# Patient Record
Sex: FEMALE | Race: BLACK OR AFRICAN AMERICAN | NOT HISPANIC OR LATINO | Employment: FULL TIME | URBAN - METROPOLITAN AREA
[De-identification: names, ages, dates, MRNs, and addresses within clinical notes are randomized per-mention and may not be internally consistent; named-entity substitution may affect disease eponyms.]

---

## 2019-11-23 ENCOUNTER — OFFICE VISIT (OUTPATIENT)
Dept: URGENT CARE | Facility: CLINIC | Age: 34
End: 2019-11-23
Payer: COMMERCIAL

## 2019-11-23 VITALS
DIASTOLIC BLOOD PRESSURE: 96 MMHG | SYSTOLIC BLOOD PRESSURE: 139 MMHG | RESPIRATION RATE: 18 BRPM | WEIGHT: 256 LBS | HEIGHT: 67 IN | HEART RATE: 90 BPM | TEMPERATURE: 99 F | BODY MASS INDEX: 40.18 KG/M2 | OXYGEN SATURATION: 99 %

## 2019-11-23 DIAGNOSIS — R68.89 FLU-LIKE SYMPTOMS: Primary | ICD-10-CM

## 2019-11-23 DIAGNOSIS — J20.9 BRONCHOSPASM WITH BRONCHITIS, ACUTE: ICD-10-CM

## 2019-11-23 LAB
CTP QC/QA: YES
FLUAV AG NPH QL: NEGATIVE
FLUBV AG NPH QL: NEGATIVE

## 2019-11-23 PROCEDURE — 87804 POCT INFLUENZA A/B: ICD-10-PCS | Mod: 59,QW,S$GLB, | Performed by: NURSE PRACTITIONER

## 2019-11-23 PROCEDURE — 87804 INFLUENZA ASSAY W/OPTIC: CPT | Mod: QW,S$GLB,, | Performed by: NURSE PRACTITIONER

## 2019-11-23 PROCEDURE — 99203 PR OFFICE/OUTPT VISIT, NEW, LEVL III, 30-44 MIN: ICD-10-PCS | Mod: S$GLB,,, | Performed by: NURSE PRACTITIONER

## 2019-11-23 PROCEDURE — 3008F PR BODY MASS INDEX (BMI) DOCUMENTED: ICD-10-PCS | Mod: CPTII,S$GLB,, | Performed by: NURSE PRACTITIONER

## 2019-11-23 PROCEDURE — 99203 OFFICE O/P NEW LOW 30 MIN: CPT | Mod: S$GLB,,, | Performed by: NURSE PRACTITIONER

## 2019-11-23 PROCEDURE — 3008F BODY MASS INDEX DOCD: CPT | Mod: CPTII,S$GLB,, | Performed by: NURSE PRACTITIONER

## 2019-11-23 RX ORDER — BENZONATATE 200 MG/1
200 CAPSULE ORAL 3 TIMES DAILY PRN
Qty: 30 CAPSULE | Refills: 0 | Status: SHIPPED | OUTPATIENT
Start: 2019-11-23 | End: 2019-12-03

## 2019-11-23 RX ORDER — PREDNISONE 20 MG/1
40 TABLET ORAL DAILY
Qty: 8 TABLET | Refills: 0 | Status: SHIPPED | OUTPATIENT
Start: 2019-11-23 | End: 2019-11-27

## 2019-11-23 RX ORDER — SERTRALINE HYDROCHLORIDE 50 MG/1
50 TABLET, FILM COATED ORAL DAILY
COMMUNITY
End: 2020-11-13 | Stop reason: ALTCHOICE

## 2019-11-23 RX ORDER — FLUTICASONE PROPIONATE 50 MCG
1 SPRAY, SUSPENSION (ML) NASAL DAILY
Qty: 1 BOTTLE | Refills: 0 | Status: SHIPPED | OUTPATIENT
Start: 2019-11-23 | End: 2020-11-13 | Stop reason: ALTCHOICE

## 2019-11-23 RX ORDER — ALBUTEROL SULFATE 90 UG/1
2 AEROSOL, METERED RESPIRATORY (INHALATION) EVERY 6 HOURS PRN
Qty: 1 INHALER | Refills: 0 | Status: SHIPPED | OUTPATIENT
Start: 2019-11-23 | End: 2020-11-13 | Stop reason: ALTCHOICE

## 2019-11-23 RX ORDER — NORETHINDRONE ACETATE AND ETHINYL ESTRADIOL .5; 2.5 MG/1; UG/1
1 TABLET ORAL DAILY
COMMUNITY

## 2019-11-23 NOTE — PROGRESS NOTES
"Subjective:       Patient ID: Joyce Dumont is a 34 y.o. female.    Vitals:  height is 5' 7" (1.702 m) and weight is 116.1 kg (256 lb). Her oral temperature is 98.6 °F (37 °C). Her blood pressure is 139/96 (abnormal) and her pulse is 90. Her respiration is 18 and oxygen saturation is 99%.     Chief Complaint: Cough; Nasal Congestion; Generalized Body Aches; and Wheezing    Pt presents today with nasal congestion, cough, body aches X's 1 week. Pt is taking OTC meds for symptoms    Cough   This is a new problem. The current episode started in the past 7 days. The problem has been unchanged. The problem occurs constantly. The cough is non-productive. Associated symptoms include headaches, myalgias, nasal congestion, postnasal drip, a sore throat and wheezing. Pertinent negatives include no chest pain, chills, ear congestion, ear pain, eye redness, fever, heartburn, hemoptysis, rash, rhinorrhea, shortness of breath, sweats or weight loss. Nothing aggravates the symptoms. Treatments tried: OTC meds. The treatment provided no relief. There is no history of asthma, bronchiectasis, bronchitis, COPD, emphysema, environmental allergies or pneumonia.   Wheezing    Associated symptoms include coughing, headaches and a sore throat. Pertinent negatives include no chest pain, chills, ear pain, fever, hemoptysis, rash, rhinorrhea, shortness of breath, sputum production or vomiting. There is no history of asthma, COPD or pneumonia.       Constitution: Negative for chills, sweating, fatigue and fever.   HENT: Positive for postnasal drip and sore throat. Negative for ear pain, congestion, sinus pain, sinus pressure and voice change.    Neck: Negative for painful lymph nodes.   Cardiovascular: Negative for chest pain.   Eyes: Negative for eye redness.   Respiratory: Positive for cough and wheezing. Negative for chest tightness, sputum production, bloody sputum, COPD, shortness of breath, stridor and asthma.    Gastrointestinal: " Negative for nausea, vomiting and heartburn.   Musculoskeletal: Positive for muscle ache.   Skin: Negative for rash.   Allergic/Immunologic: Negative for environmental allergies, seasonal allergies and asthma.   Neurological: Positive for headaches.   Hematologic/Lymphatic: Negative for swollen lymph nodes.       Objective:      Physical Exam   Constitutional: She is oriented to person, place, and time. She appears well-developed and well-nourished. She is cooperative.  Non-toxic appearance. She has a sickly appearance. She does not appear ill. No distress.   HENT:   Head: Normocephalic and atraumatic.   Right Ear: Hearing, tympanic membrane, external ear and ear canal normal.   Left Ear: Hearing, tympanic membrane, external ear and ear canal normal.   Nose: Mucosal edema and rhinorrhea present. No nasal deformity. No epistaxis. Right sinus exhibits no maxillary sinus tenderness and no frontal sinus tenderness. Left sinus exhibits no maxillary sinus tenderness and no frontal sinus tenderness.   Mouth/Throat: Uvula is midline and mucous membranes are normal. No trismus in the jaw. Normal dentition. No uvula swelling. Posterior oropharyngeal edema and posterior oropharyngeal erythema present. No oropharyngeal exudate.   Eyes: Conjunctivae and lids are normal. No scleral icterus.   Neck: Trachea normal, full passive range of motion without pain and phonation normal. Neck supple. No neck rigidity. No edema and no erythema present.   Cardiovascular: Normal rate, regular rhythm, normal heart sounds, intact distal pulses and normal pulses.   Pulses:       Radial pulses are 2+ on the right side, and 2+ on the left side.   Pulmonary/Chest: Effort normal. No respiratory distress. She has decreased breath sounds. She has wheezes. She has no rhonchi.   Abdominal: Normal appearance.   Musculoskeletal: Normal range of motion. She exhibits no edema or deformity.   Lymphadenopathy:     She has cervical adenopathy.        Right  cervical: Superficial cervical adenopathy present.        Left cervical: Superficial cervical adenopathy present.   Neurological: She is alert and oriented to person, place, and time. She exhibits normal muscle tone. Coordination normal.   Skin: Skin is warm, dry, intact, not diaphoretic and not pale.   Psychiatric: She has a normal mood and affect. Her speech is normal and behavior is normal. Judgment and thought content normal. Cognition and memory are normal.   Nursing note and vitals reviewed.        Results for orders placed or performed in visit on 11/23/19   POCT Influenza A/B   Result Value Ref Range    Rapid Influenza A Ag Negative Negative    Rapid Influenza B Ag Negative Negative     Acceptable Yes        Assessment:       1. Flu-like symptoms    2. Bronchospasm with bronchitis, acute        Plan:       Close f/u if not improving.    Flu-like symptoms  -     POCT Influenza A/B    Bronchospasm with bronchitis, acute  -     benzonatate (TESSALON) 200 MG capsule; Take 1 capsule (200 mg total) by mouth 3 (three) times daily as needed.  Dispense: 30 capsule; Refill: 0  -     fluticasone propionate (FLONASE) 50 mcg/actuation nasal spray; 1 spray (50 mcg total) by Each Nostril route once daily.  Dispense: 1 Bottle; Refill: 0  -     predniSONE (DELTASONE) 20 MG tablet; Take 2 tablets (40 mg total) by mouth once daily. for 4 days  Dispense: 8 tablet; Refill: 0  -     albuterol (PROVENTIL/VENTOLIN HFA) 90 mcg/actuation inhaler; Inhale 2 puffs into the lungs every 6 (six) hours as needed. Rescue  Dispense: 1 Inhaler; Refill: 0      Patient Instructions   Follow up with your doctor in a few days.  Return to the urgent care or go to the ER if symptoms get worse.      START ORAL STEROID TODAY.    COUGH: MUCINEX DM AS DIRECTED.  TESSALON DURING THE DAY FOR COUGH CONTROL       WARM SALT WATER GARGLES FOR THROAT DISCOMFORT.    TYLENOL EVERY 4 HOURS OR/AND MOTRIN 600MG EVERY 6 HOURS FOR THROAT  PAIN/FEVER.    SINUS CONGESTION:    Continue cold medication plus add flonase daily.    SEE HANDOUT ON SINUSITUS AND BRONCHITIS      IF SYMPTOMS FAIL TO IMPROVE IN THE NEXT 5-7 DAYS, OR IMPROVE AND THEN WORSEN, FOLLOW UP WITH A HEALTHCARE PROVIDER.      Bronchitis, Viral (Adult)    You have a viral bronchitis. Bronchitis is inflammation and swelling of the lining of the lungs. This is often caused by an infection. Symptoms include a dry, hacking cough that is worse at night. The cough may bring up yellow-green mucus. You may also feel short of breath or wheeze. Other symptoms may include tiredness, chest discomfort, and chills.  Bronchitis that is caused by a virus is not treated with antibiotics. Instead, medicines may be given to help relieve symptoms. Symptoms can last up to 2 weeks, although the cough may last much longer.  This illness is contagious during the first few days and is spread through the air by coughing and sneezing, or by direct contact (touching the sick person and then touching your own eyes, nose, or mouth).  Most viral illnesses resolve within 10 to 14 days with rest and simple home remedies, although they may sometimes last for several weeks.  Home care  · If symptoms are severe, rest at home for the first 2 to 3 days. When you go back to your usual activities, don't let yourself get too tired.  · Do not smoke. Also avoid being exposed to secondhand smoke.  · You may use over-the-counter medicine to control fever or pain, unless another pain medicine was prescribed. (Note: If you have chronic liver or kidney disease or have ever had a stomach ulcer or gastrointestinal bleeding, talk with your healthcare provider before using these medicines. Also talk to your provider if you are taking medicine to prevent blood clots.) Aspirin should never be given to anyone younger than 18 years of age who is ill with a viral infection or fever. It may cause severe liver or brain damage.  · Your appetite may  be poor, so a light diet is fine. Avoid dehydration by drinking 6 to 8 glasses of fluids per day (such as water, soft drinks, sports drinks, juices, tea, or soup). Extra fluids will help loosen secretions in the nose and lungs.  · Over-the-counter cough, cold, and sore-throat medicines will not shorten the length of the illness, but they may help to reduce symptoms. (Note: Do not use decongestants if you have high blood pressure.)  Follow-up care  Follow up with your healthcare provider, or as advised. If you had an X-ray or ECG (electrocardiogram), a specialist will review it. You will be notified of any new findings that may affect your care.  Note: If you are age 65 or older, or if you have a chronic lung disease or condition that affects your immune system, or you smoke, talk to your healthcare provider about having pneumococcal vaccinations and a yearly influenza vaccination (flu shot).  When to seek medical advice  Call your healthcare provider right away if any of these occur:  · Fever of 100.4°F (38°C) or higher  · Coughing up increased amounts of colored sputum  · Weakness, drowsiness, headache, facial pain, ear pain, or a stiff neck  Call 911, or get immediate medical care  Contact emergency services right away if any of these occur:  · Coughing up blood  · Worsening weakness, drowsiness, headache, or stiff neck  · Trouble breathing, wheezing, or pain with breathing  Date Last Reviewed: 9/13/2015 © 2000-2017 [employer]. [address] [place] [address]. All rights reserved. This information is not intended as a substitute for professional medical care. Always follow your healthcare professional's instructions.        Sinusitis (No Antibiotics)    The sinuses are air-filled spaces within the bones of the face. They connect to the inside of the nose. Sinusitis is an inflammation of the tissue lining the sinus cavity. Sinus inflammation can occur during a cold. It can also be due to  allergies to pollens and other particles in the air. It can cause symptoms such as sinus congestion, headache, sore throat, facial swelling and fullness. It may also cause a low-grade fever. No infection is present, and no antibiotic treatment is needed.  Home care  · Drink plenty of water, hot tea, and other liquids. This may help thin mucus. It also may promote sinus drainage.  · Heat may help soothe painful areas of the face. Use a towel soaked in hot water. Or,  the shower and direct the hot spray onto your face. Using a vaporizer along with a menthol rub at night may also help.   · An expectorant containing guaifenesin may help thin the mucus and promote drainage from the sinuses.  · Over-the-counter decongestants may be used unless a similar medicine was prescribed. Nasal sprays work the fastest. Use one that contains phenylephrine or oxymetazoline. First blow the nose gently. Then use the spray. Do not use these medicines more often than directed on the label or symptoms may get worse. You may also use tablets containing pseudoephedrine. Avoid products that combine ingredients, because side effects may be increased. Read labels. You can also ask the pharmacist for help. (NOTE: Persons with high blood pressure should not use decongestants. They can raise blood pressure.)  · Over-the-counter antihistamines may help if allergies contributed to your sinusitis.    · Use acetaminophen or ibuprofen to control pain, unless another pain medicine was prescribed. (If you have chronic liver or kidney disease or ever had a stomach ulcer, talk with your doctor before using these medicines. Aspirin should never be used in anyone under 18 years of age who is ill with a fever. It may cause severe liver damage.)  · Use nasal rinses or irrigation as instructed by your health care provider.  · Don't smoke. This can worsen symptoms.  Follow-up care  Follow up with your healthcare provider or our staff if you are not  improving within the next week.  When to seek medical advice  Call your healthcare provider if any of these occur:  · Green or yellow discharge from the nose or into the throat  · Facial pain or headache becoming more severe  · Stiff neck  · Unusual drowsiness or confusion  · Swelling of the forehead or eyelids  · Vision problems, including blurred or double vision  · Fever of 100.4ºF (38ºC) or higher, or as directed by your healthcare provider  · Seizure  · Breathing problems  · Symptoms not resolving within 10 days  Date Last Reviewed: 4/13/2015  © 6146-4582 [employer]. [address] [place] [address]. All rights reserved. This information is not intended as a substitute for professional medical care. Always follow your healthcare professional's instructions.

## 2019-11-23 NOTE — PATIENT INSTRUCTIONS
Follow up with your doctor in a few days.  Return to the urgent care or go to the ER if symptoms get worse.      START ORAL STEROID TODAY.    COUGH: MUCINEX DM AS DIRECTED.  TESSALON DURING THE DAY FOR COUGH CONTROL       WARM SALT WATER GARGLES FOR THROAT DISCOMFORT.    TYLENOL EVERY 4 HOURS OR/AND MOTRIN 600MG EVERY 6 HOURS FOR THROAT PAIN/FEVER.    SINUS CONGESTION:    Continue cold medication plus add flonase daily.    SEE HANDOUT ON SINUSITUS AND BRONCHITIS      IF SYMPTOMS FAIL TO IMPROVE IN THE NEXT 5-7 DAYS, OR IMPROVE AND THEN WORSEN, FOLLOW UP WITH A HEALTHCARE PROVIDER.      Bronchitis, Viral (Adult)    You have a viral bronchitis. Bronchitis is inflammation and swelling of the lining of the lungs. This is often caused by an infection. Symptoms include a dry, hacking cough that is worse at night. The cough may bring up yellow-green mucus. You may also feel short of breath or wheeze. Other symptoms may include tiredness, chest discomfort, and chills.  Bronchitis that is caused by a virus is not treated with antibiotics. Instead, medicines may be given to help relieve symptoms. Symptoms can last up to 2 weeks, although the cough may last much longer.  This illness is contagious during the first few days and is spread through the air by coughing and sneezing, or by direct contact (touching the sick person and then touching your own eyes, nose, or mouth).  Most viral illnesses resolve within 10 to 14 days with rest and simple home remedies, although they may sometimes last for several weeks.  Home care  · If symptoms are severe, rest at home for the first 2 to 3 days. When you go back to your usual activities, don't let yourself get too tired.  · Do not smoke. Also avoid being exposed to secondhand smoke.  · You may use over-the-counter medicine to control fever or pain, unless another pain medicine was prescribed. (Note: If you have chronic liver or kidney disease or have ever had a stomach ulcer or  gastrointestinal bleeding, talk with your healthcare provider before using these medicines. Also talk to your provider if you are taking medicine to prevent blood clots.) Aspirin should never be given to anyone younger than 18 years of age who is ill with a viral infection or fever. It may cause severe liver or brain damage.  · Your appetite may be poor, so a light diet is fine. Avoid dehydration by drinking 6 to 8 glasses of fluids per day (such as water, soft drinks, sports drinks, juices, tea, or soup). Extra fluids will help loosen secretions in the nose and lungs.  · Over-the-counter cough, cold, and sore-throat medicines will not shorten the length of the illness, but they may help to reduce symptoms. (Note: Do not use decongestants if you have high blood pressure.)  Follow-up care  Follow up with your healthcare provider, or as advised. If you had an X-ray or ECG (electrocardiogram), a specialist will review it. You will be notified of any new findings that may affect your care.  Note: If you are age 65 or older, or if you have a chronic lung disease or condition that affects your immune system, or you smoke, talk to your healthcare provider about having pneumococcal vaccinations and a yearly influenza vaccination (flu shot).  When to seek medical advice  Call your healthcare provider right away if any of these occur:  · Fever of 100.4°F (38°C) or higher  · Coughing up increased amounts of colored sputum  · Weakness, drowsiness, headache, facial pain, ear pain, or a stiff neck  Call 911, or get immediate medical care  Contact emergency services right away if any of these occur:  · Coughing up blood  · Worsening weakness, drowsiness, headache, or stiff neck  · Trouble breathing, wheezing, or pain with breathing  Date Last Reviewed: 9/13/2015 © 2000-2017 [employer]. [address] [place] [address], [place] [address]. All rights reserved. This information is not intended as a substitute for professional  medical care. Always follow your healthcare professional's instructions.        Sinusitis (No Antibiotics)    The sinuses are air-filled spaces within the bones of the face. They connect to the inside of the nose. Sinusitis is an inflammation of the tissue lining the sinus cavity. Sinus inflammation can occur during a cold. It can also be due to allergies to pollens and other particles in the air. It can cause symptoms such as sinus congestion, headache, sore throat, facial swelling and fullness. It may also cause a low-grade fever. No infection is present, and no antibiotic treatment is needed.  Home care  · Drink plenty of water, hot tea, and other liquids. This may help thin mucus. It also may promote sinus drainage.  · Heat may help soothe painful areas of the face. Use a towel soaked in hot water. Or,  the shower and direct the hot spray onto your face. Using a vaporizer along with a menthol rub at night may also help.   · An expectorant containing guaifenesin may help thin the mucus and promote drainage from the sinuses.  · Over-the-counter decongestants may be used unless a similar medicine was prescribed. Nasal sprays work the fastest. Use one that contains phenylephrine or oxymetazoline. First blow the nose gently. Then use the spray. Do not use these medicines more often than directed on the label or symptoms may get worse. You may also use tablets containing pseudoephedrine. Avoid products that combine ingredients, because side effects may be increased. Read labels. You can also ask the pharmacist for help. (NOTE: Persons with high blood pressure should not use decongestants. They can raise blood pressure.)  · Over-the-counter antihistamines may help if allergies contributed to your sinusitis.    · Use acetaminophen or ibuprofen to control pain, unless another pain medicine was prescribed. (If you have chronic liver or kidney disease or ever had a stomach ulcer, talk with your doctor before using  these medicines. Aspirin should never be used in anyone under 18 years of age who is ill with a fever. It may cause severe liver damage.)  · Use nasal rinses or irrigation as instructed by your health care provider.  · Don't smoke. This can worsen symptoms.  Follow-up care  Follow up with your healthcare provider or our staff if you are not improving within the next week.  When to seek medical advice  Call your healthcare provider if any of these occur:  · Green or yellow discharge from the nose or into the throat  · Facial pain or headache becoming more severe  · Stiff neck  · Unusual drowsiness or confusion  · Swelling of the forehead or eyelids  · Vision problems, including blurred or double vision  · Fever of 100.4ºF (38ºC) or higher, or as directed by your healthcare provider  · Seizure  · Breathing problems  · Symptoms not resolving within 10 days  Date Last Reviewed: 4/13/2015  © 8833-6626 [employer]. [address] [place] [address], [place] [address]. All rights reserved. This information is not intended as a substitute for professional medical care. Always follow your healthcare professional's instructions.

## 2020-06-18 ENCOUNTER — OFFICE VISIT (OUTPATIENT)
Dept: URGENT CARE | Facility: CLINIC | Age: 35
End: 2020-06-18
Payer: COMMERCIAL

## 2020-06-18 VITALS
RESPIRATION RATE: 16 BRPM | HEIGHT: 67 IN | HEART RATE: 95 BPM | SYSTOLIC BLOOD PRESSURE: 151 MMHG | BODY MASS INDEX: 41.12 KG/M2 | TEMPERATURE: 98 F | OXYGEN SATURATION: 99 % | DIASTOLIC BLOOD PRESSURE: 98 MMHG | WEIGHT: 262 LBS

## 2020-06-18 DIAGNOSIS — R11.0 NAUSEA: Primary | ICD-10-CM

## 2020-06-18 PROCEDURE — U0003 INFECTIOUS AGENT DETECTION BY NUCLEIC ACID (DNA OR RNA); SEVERE ACUTE RESPIRATORY SYNDROME CORONAVIRUS 2 (SARS-COV-2) (CORONAVIRUS DISEASE [COVID-19]), AMPLIFIED PROBE TECHNIQUE, MAKING USE OF HIGH THROUGHPUT TECHNOLOGIES AS DESCRIBED BY CMS-2020-01-R: HCPCS

## 2020-06-18 PROCEDURE — 99211 PR OFFICE/OUTPT VISIT, EST, LEVL I: ICD-10-PCS | Mod: S$GLB,,, | Performed by: FAMILY MEDICINE

## 2020-06-18 PROCEDURE — 99211 OFF/OP EST MAY X REQ PHY/QHP: CPT | Mod: S$GLB,,, | Performed by: FAMILY MEDICINE

## 2020-06-19 ENCOUNTER — TELEPHONE (OUTPATIENT)
Dept: URGENT CARE | Facility: CLINIC | Age: 35
End: 2020-06-19

## 2020-06-19 LAB — SARS-COV-2 RNA RESP QL NAA+PROBE: NOT DETECTED

## 2020-06-19 NOTE — PROGRESS NOTES
Patient here for covid testing, sent by her PCP after being treated for nausea, sinus drainage.

## 2020-06-20 NOTE — TELEPHONE ENCOUNTER
----- Message from Sajan Ibrahim PA-C sent at 6/19/2020  7:31 PM CDT -----  Please call patient regarding neg test . Please reply to me once completed. Thanks!

## 2020-07-03 ENCOUNTER — OFFICE VISIT (OUTPATIENT)
Dept: URGENT CARE | Facility: CLINIC | Age: 35
End: 2020-07-03
Payer: COMMERCIAL

## 2020-07-03 VITALS
SYSTOLIC BLOOD PRESSURE: 145 MMHG | TEMPERATURE: 99 F | OXYGEN SATURATION: 99 % | DIASTOLIC BLOOD PRESSURE: 98 MMHG | HEART RATE: 98 BPM

## 2020-07-03 DIAGNOSIS — M79.10 MUSCLE PAIN: ICD-10-CM

## 2020-07-03 DIAGNOSIS — M54.42 ACUTE BACK PAIN WITH SCIATICA, LEFT: ICD-10-CM

## 2020-07-03 DIAGNOSIS — Z20.822 EXPOSURE TO COVID-19 VIRUS: Primary | ICD-10-CM

## 2020-07-03 PROCEDURE — 99213 OFFICE O/P EST LOW 20 MIN: CPT | Mod: 25,S$GLB,, | Performed by: PHYSICIAN ASSISTANT

## 2020-07-03 PROCEDURE — 96372 PR INJECTION,THERAP/PROPH/DIAG2ST, IM OR SUBCUT: ICD-10-PCS | Mod: S$GLB,,, | Performed by: FAMILY MEDICINE

## 2020-07-03 PROCEDURE — 96372 THER/PROPH/DIAG INJ SC/IM: CPT | Mod: S$GLB,,, | Performed by: FAMILY MEDICINE

## 2020-07-03 PROCEDURE — U0003 INFECTIOUS AGENT DETECTION BY NUCLEIC ACID (DNA OR RNA); SEVERE ACUTE RESPIRATORY SYNDROME CORONAVIRUS 2 (SARS-COV-2) (CORONAVIRUS DISEASE [COVID-19]), AMPLIFIED PROBE TECHNIQUE, MAKING USE OF HIGH THROUGHPUT TECHNOLOGIES AS DESCRIBED BY CMS-2020-01-R: HCPCS

## 2020-07-03 PROCEDURE — 99213 PR OFFICE/OUTPT VISIT, EST, LEVL III, 20-29 MIN: ICD-10-PCS | Mod: 25,S$GLB,, | Performed by: PHYSICIAN ASSISTANT

## 2020-07-03 RX ORDER — KETOROLAC TROMETHAMINE 30 MG/ML
30 INJECTION, SOLUTION INTRAMUSCULAR; INTRAVENOUS
Status: COMPLETED | OUTPATIENT
Start: 2020-07-03 | End: 2020-07-03

## 2020-07-03 RX ADMIN — KETOROLAC TROMETHAMINE 30 MG: 30 INJECTION, SOLUTION INTRAMUSCULAR; INTRAVENOUS at 12:07

## 2020-07-03 NOTE — PATIENT INSTRUCTIONS
Sciatica    Sciatica is a condition that causes pain in the lower back that spreads down into the buttock, hip, and leg. Sometimes the leg pain can happen without any back pain. Sciatica happens when a spinal nerve is irritated or has pressure put on it as comes out of the spinal canal in the lower back. This most often happens when a bulge or rupture of a nearby spinal disk presses on the nerve. Sciatica can also be caused by a narrowing of the spinal canal (spinal stenosis) or spasm of the muscle in the buttocks that the sciatic nerve passes through (pyriform muscle). Sciatica is also called lumbar radiculopathy.  Sciatica may begin after a sudden twisting or bending force, such as in a car accident. Or it can happen after a simple awkward movement. In either case, muscle spasm often also happens. Muscle spasm makes the pain worse.  A healthcare provider makes a diagnosis of sciatica from your symptoms and a physical exam. Unless you had an injury from a car accident or fall, you usually wont have X-rays taken at this time. This is because the nerves and disks in your back cant be seen on an X-ray. If the provider sees signs of a compressed nerve, you will need to schedule an MRI scan as an outpatient. Signs of a compressed nerve include loss of strength in a leg.  Most sciatica gets better with medicine, exercise, and physical therapy. If your symptoms continue after at least 3 months of medical treatment, you may need surgery or injections to your lower back.  Home care  Follow these tips when caring for yourself at home:  · You may need to stay in bed the first few days. But as soon as possible, begin sitting up or walking. This will help you avoid problems that come from staying in bed for long periods.  · When in bed, try to find a position that is comfortable. A firm mattress is best. Try lying flat on your back with pillows under your knees. You can also try lying on your side with your knees bent up  toward your chest and a pillow between your knees.  · Avoid sitting for long periods. This puts more stress on your lower back than standing or walking.  · Use heat from a hot shower, hot bath, or heating pad to help ease pain. Massage can also help. You can also try using an ice pack. You can make your own ice pack by putting ice cubes in a plastic bag. Wrap the bag in a thin towel. Try both heat and cold to see which works best. Use the method that feels best for 20 minutes several times a day.  · You may use acetaminophen or ibuprofen to ease pain, unless another pain medicine was prescribed. Note: If you have chronic liver or kidney disease, talk with your healthcare provider before taking these medicines. Also talk with your provider if youve had a stomach ulcer or gastrointestinal bleeding.  · Use safe lifting methods. Dont lift anything heavier than 15 pounds until all of the pain is gone.  Follow-up care  Follow up with your healthcare provider, or as advised. You may need physical therapy or additional tests.  If X-rays were taken, a radiologist will look at them. You will be told of any new findings that may affect your care.  When to seek medical advice  Call your healthcare provider right away if any of these occur:  · Pain gets worse even after taking prescribed medicine  · Weakness or numbness in 1 or both legs or hips  · Numbness in your groin or genital area  · You cant control your bowel or bladder  · Fever  · Redness or swelling over your back or spine   Date Last Reviewed: 8/1/2016  © 4245-8639 [employer]. [address] [place] [address], [place] [address]. All rights reserved. This information is not intended as a substitute for professional medical care. Always follow your healthcare professional's instructions.    Instructions for Patients Awaiting COVID-19 Test Results    You will either be called with your test result or it will be released to the patient portal.  If you have any  questions about your test, please visit [email or web address] or call our COVID-19 information line at [phone].    Prevention steps for patients with confirmed or suspected COVID-19     Stay home except to get medical care.   Separate yourself from other people and animals in your home   Call ahead before visiting your doctor.   Wear a facemask.   Cover your coughs and sneezes.   Clean your hands often.   Avoid sharing personal household items.   Clean all high-touch surfaces every day.   Monitor your symptoms. Seek prompt medical attention if your illness is worsening (e.g., difficulty breathing). Before seeking care, call your healthcare provider.   If you have a medical emergency and need to call 911, notify the dispatch personnel that you have, or are being evaluated for COVID-19. If possible, put on a facemask before emergency medical services arrive.   Discontinuing home isolation. Call your provider about guidance to discontinue home isolation.    Recommended precautions for household members, intimate partners, and caregivers in a nonhealthcare setting of a patient with symptomatic laboratory-confirmed COVID-19 or a patient under investigation.  Household members, intimate partners, and caregivers in a nonhealthcare setting may have close contact with a person with symptomatic, laboratory-confirmed COVID-19 or a person under investigation. Close contacts should monitor their health; they should call their healthcare provider right away if they develop symptoms suggestive of COVID-19 (e.g., fever, cough, shortness of breath).    Close contacts should also follow these recommendations:   Make sure that you understand and can help the patient follow their healthcare provider's instructions for medication(s) and care. You should help the patient with basic needs in the home and provide support for getting groceries, prescriptions, and other personal needs.   Monitor the patient's  symptoms. If the patient is getting sicker, call his or her healthcare provider and tell them that the patient has laboratory-confirmed COVID-19. This will help the healthcare provider's office take steps to keep other people in the office or waiting room from getting infected. Ask the healthcare provider to call the local or state health department for additional guidance. If the patient has a medical emergency and you need to call 911, notify the dispatch personnel that the patient has, or is being evaluated for COVID-19.   Household members should stay in another room or be  from the patient as much as possible. Household members should use a separate bedroom and bathroom, if available.   Prohibit visitors who do not have an essential need to be in the home.   Household members should care for any pets in the home. Do not handle pets or other animals while sick.   Make sure that shared spaces in the home have good air flow, such as by an air conditioner or an opened window, weather permitting.   Perform hand hygiene frequently. Wash your hands often with soap and water for at least 20 seconds or use an alcohol-based hand  that contains 60 to 95% alcohol, covering all surfaces of your hands and rubbing them together until they feel dry. Soap and water should be used preferentially if hands are visibly dirty.   Avoid touching your eyes, nose, and mouth with unwashed hands.   The patient should wear a facemask when you are around other people. If the patient is not able to wear a facemask (for example, because it causes trouble breathing), you, as the caregiver should wear a mask when you are in the same room as the patient.   Wear a disposable facemask and gloves when you touch or have contact with the patient's blood, stool, or body fluids, such as saliva, sputum, nasal mucus, vomit, urine.  o Throw out disposable facemasks and gloves after using them. Do not reuse.  o When removing  personal protective equipment, first remove and dispose of gloves. Then, immediately clean your hands with soap and water or alcohol-based hand . Next, remove and dispose of facemask, and immediately clean your hands again with soap and water or alcohol-based hand .   Avoid sharing household items with the patient. You should not share dishes, drinking glasses, cups, eating utensils, towels, bedding, or other items. After the patient uses these items, you should wash them thoroughly (see below Wash laundry thoroughly).   Clean all high-touch surfaces, such as counters, tabletops, doorknobs, bathroom fixtures, toilets, phones, keyboards, tablets, and bedside tables, every day. Also, clean any surfaces that may have blood, stool, or body fluids on them.   Use a household cleaning spray or wipe, according to the label instructions. Labels contain instructions for safe and effective use of the cleaning product including precautions you should take when applying the product, such as wearing gloves and making sure you have good ventilation during use of the product.   Wash laundry thoroughly.  o Immediately remove and wash clothes or bedding that have blood, stool, or body fluids on them.  o Wear disposable gloves while handling soiled items and keep soiled items away from your body. Clean your hands (with soap and water or an alcohol-based hand ) immediately after removing your gloves.  o Read and follow directions on labels of laundry or clothing items and detergent. In general, using a normal laundry detergent according to washing machine instructions and dry thoroughly using the warmest temperatures recommended on the clothing label.   Place all used disposable gloves, facemasks, and other contaminated items in a lined container before disposing of them with other household waste. Clean your hands (with soap and water or an alcohol-based hand ) immediately after handling  these items. Soap and water should be used preferentially if hands are visibly dirty.   Discuss any additional questions with your state or local health department or healthcare provider. Check available hours when contacting your local health department.    For more information see CDC link below.      https://www.cdc.gov/coronavirus/2019-ncov/hcp/guidance-prevent-spread.html#precautions        Sources:  [facility], [place] [facility] and [facility]        If not allergic,take tylenol (acetominophen) for fever control, chills, or body aches every 4 hours. Do not exceed 4000 mg/ day.If not allergic, take Motrin (Ibuprofen) every 4 hours for fever, chills, pain or inflammation. Do not exceed 2400 mg/day. You can alternate taking tylenol and motrin.    If you were prescribed a narcotic medication, do not drive or operate heavy equipment or machinery while taking these medications.  You must understand that you've received an Urgent Care treatment only and that you may be released before all your medical problems are known or treated. You, the patient, will arrange for follow up care as instructed.  Follow up with your PCP or specialty clinic as directed in the next 1-2 weeks if not improved or as needed.  You can call [phone] to schedule an appointment with the appropriate provider.  If your condition worsens we recommend that you receive another evaluation at the emergency room immediately or contact your primary medical clinics after hours call service to discuss your concerns.    Please return here or go to the Emergency Department for any concerns or worsening of condition.    If you have been referred to another provider and wish to call to check on the status of your referral, please call [facility] Sched[facility] at [phone]

## 2020-07-03 NOTE — PROGRESS NOTES
Subjective:       Patient ID: Joyce Dumont is a 35 y.o. female.    Vitals:  temperature is 98.6 °F (37 °C). Her blood pressure is 145/98 (abnormal) and her pulse is 98. Her oxygen saturation is 99%.     Chief Complaint: COVID-19 Concerns    Patient has had a sore throat and headaches off and on all week. She is also complaining about her sciatic nerve that has been hurting for 3 days. Patient was tested two weeks with  and  tested positive and she tested negative.     Other  This is a new problem. The current episode started in the past 7 days. The problem occurs constantly. The problem has been gradually worsening. Associated symptoms include chills, coughing, fatigue, headaches and a sore throat. Pertinent negatives include no abdominal pain, anorexia, arthralgias, change in bowel habit, chest pain, congestion, diaphoresis, fever, joint swelling, myalgias, nausea, neck pain, numbness, rash, swollen glands, urinary symptoms, vertigo, visual change, vomiting or weakness. Nothing aggravates the symptoms. Treatments tried: naproxen. The treatment provided mild relief.       Constitution: Positive for chills and fatigue. Negative for sweating and fever.   HENT: Positive for sore throat. Negative for congestion.    Neck: Negative for neck pain and painful lymph nodes.   Cardiovascular: Negative for chest pain and leg swelling.   Eyes: Negative for double vision and blurred vision.   Respiratory: Positive for cough. Negative for shortness of breath.    Gastrointestinal: Negative for abdominal pain, nausea, vomiting and diarrhea.   Genitourinary: Negative for dysuria, frequency, urgency and history of kidney stones.   Musculoskeletal: Negative for joint pain, joint swelling, muscle cramps and muscle ache.   Skin: Negative for color change, pale, rash and bruising.   Allergic/Immunologic: Negative for seasonal allergies.   Neurological: Positive for headaches. Negative for dizziness, history of  vertigo, light-headedness, passing out and numbness.   Hematologic/Lymphatic: Negative for swollen lymph nodes.   Psychiatric/Behavioral: Negative for nervous/anxious, sleep disturbance and depression. The patient is not nervous/anxious.        Objective:      Physical Exam   Constitutional: She is oriented to person, place, and time. She appears well-developed. She is cooperative.  Non-toxic appearance. She does not appear ill. No distress.   HENT:   Head: Normocephalic and atraumatic.   Right Ear: Hearing, tympanic membrane, external ear and ear canal normal.   Left Ear: Hearing, tympanic membrane, external ear and ear canal normal.   Nose: Nose normal. No mucosal edema, rhinorrhea or nasal deformity. No epistaxis. Right sinus exhibits no maxillary sinus tenderness and no frontal sinus tenderness. Left sinus exhibits no maxillary sinus tenderness and no frontal sinus tenderness.   Mouth/Throat: Uvula is midline, oropharynx is clear and moist and mucous membranes are normal. No trismus in the jaw. Normal dentition. No uvula swelling. No posterior oropharyngeal erythema.   Eyes: Conjunctivae and lids are normal. Right eye exhibits no discharge. Left eye exhibits no discharge. No scleral icterus.   Neck: Trachea normal, normal range of motion, full passive range of motion without pain and phonation normal. Neck supple.   Cardiovascular: Normal rate, regular rhythm, normal heart sounds and normal pulses.   Pulmonary/Chest: Effort normal and breath sounds normal. No respiratory distress.   Abdominal: Soft. Normal appearance and bowel sounds are normal. She exhibits no distension, no pulsatile midline mass and no mass. There is no abdominal tenderness.   Musculoskeletal:         General: No deformity.      Right hip: She exhibits decreased range of motion (limited ER hip w/o pain). She exhibits normal strength, no tenderness and no bony tenderness.      Left hip: She exhibits decreased range of motion (limited ER hip w  pain, paresthesia in HS). She exhibits normal strength, no tenderness, no bony tenderness and no swelling.   Neurological: She is alert and oriented to person, place, and time. She exhibits normal muscle tone. Coordination normal.   Skin: Skin is warm, dry, intact, not diaphoretic and not pale.   Psychiatric: Her speech is normal and behavior is normal. Judgment and thought content normal.   Nursing note and vitals reviewed.        Assessment:       1. Exposure to Covid-19 Virus    2. Muscle pain    3. Acute back pain with sciatica, left        Plan:         Exposure to Covid-19 Virus  -     ketorolac injection 30 mg    Muscle pain  -     COVID-19 Routine Screening  -     ketorolac injection 30 mg    Acute back pain with sciatica, left  -     ketorolac injection 30 mg    patient to resume HEP from PT for previous sciatica. Will call PT for another appointment.   Will call with results.   Patient Instructions     Sciatica    Sciatica is a condition that causes pain in the lower back that spreads down into the buttock, hip, and leg. Sometimes the leg pain can happen without any back pain. Sciatica happens when a spinal nerve is irritated or has pressure put on it as comes out of the spinal canal in the lower back. This most often happens when a bulge or rupture of a nearby spinal disk presses on the nerve. Sciatica can also be caused by a narrowing of the spinal canal (spinal stenosis) or spasm of the muscle in the buttocks that the sciatic nerve passes through (pyriform muscle). Sciatica is also called lumbar radiculopathy.  Sciatica may begin after a sudden twisting or bending force, such as in a car accident. Or it can happen after a simple awkward movement. In either case, muscle spasm often also happens. Muscle spasm makes the pain worse.  A healthcare provider makes a diagnosis of sciatica from your symptoms and a physical exam. Unless you had an injury from a car accident or fall, you usually wont have X-rays  taken at this time. This is because the nerves and disks in your back cant be seen on an X-ray. If the provider sees signs of a compressed nerve, you will need to schedule an MRI scan as an outpatient. Signs of a compressed nerve include loss of strength in a leg.  Most sciatica gets better with medicine, exercise, and physical therapy. If your symptoms continue after at least 3 months of medical treatment, you may need surgery or injections to your lower back.  Home care  Follow these tips when caring for yourself at home:  · You may need to stay in bed the first few days. But as soon as possible, begin sitting up or walking. This will help you avoid problems that come from staying in bed for long periods.  · When in bed, try to find a position that is comfortable. A firm mattress is best. Try lying flat on your back with pillows under your knees. You can also try lying on your side with your knees bent up toward your chest and a pillow between your knees.  · Avoid sitting for long periods. This puts more stress on your lower back than standing or walking.  · Use heat from a hot shower, hot bath, or heating pad to help ease pain. Massage can also help. You can also try using an ice pack. You can make your own ice pack by putting ice cubes in a plastic bag. Wrap the bag in a thin towel. Try both heat and cold to see which works best. Use the method that feels best for 20 minutes several times a day.  · You may use acetaminophen or ibuprofen to ease pain, unless another pain medicine was prescribed. Note: If you have chronic liver or kidney disease, talk with your healthcare provider before taking these medicines. Also talk with your provider if youve had a stomach ulcer or gastrointestinal bleeding.  · Use safe lifting methods. Dont lift anything heavier than 15 pounds until all of the pain is gone.  Follow-up care  Follow up with your healthcare provider, or as advised. You may need physical therapy or  additional tests.  If X-rays were taken, a radiologist will look at them. You will be told of any new findings that may affect your care.  When to seek medical advice  Call your healthcare provider right away if any of these occur:  · Pain gets worse even after taking prescribed medicine  · Weakness or numbness in 1 or both legs or hips  · Numbness in your groin or genital area  · You cant control your bowel or bladder  · Fever  · Redness or swelling over your back or spine   Date Last Reviewed: 8/1/2016 © 2000-2017 [employer]. [address] [place] [id number]. All rights reserved. This information is not intended as a substitute for professional medical care. Always follow your healthcare professional's instructions.    Instructions for Patients Awaiting COVID-19 Test Results    You will either be called with your test result or it will be released to the patient portal.  If you have any questions about your test, please visit [email or web address] or call our COVID-19 information line at [phone].    Prevention steps for patients with confirmed or suspected COVID-19     Stay home except to get medical care.   Separate yourself from other people and animals in your home   Call ahead before visiting your doctor.   Wear a facemask.   Cover your coughs and sneezes.   Clean your hands often.   Avoid sharing personal household items.   Clean all high-touch surfaces every day.   Monitor your symptoms. Seek prompt medical attention if your illness is worsening (e.g., difficulty breathing). Before seeking care, call your healthcare provider.   If you have a medical emergency and need to call 911, notify the dispatch personnel that you have, or are being evaluated for COVID-19. If possible, put on a facemask before emergency medical services arrive.   Discontinuing home isolation. Call your provider about guidance to discontinue home isolation.    Recommended precautions  for household members, intimate partners, and caregivers in a nonhealthcare setting of a patient with symptomatic laboratory-confirmed COVID-19 or a patient under investigation.  Household members, intimate partners, and caregivers in a nonhealthcare setting may have close contact with a person with symptomatic, laboratory-confirmed COVID-19 or a person under investigation. Close contacts should monitor their health; they should call their healthcare provider right away if they develop symptoms suggestive of COVID-19 (e.g., fever, cough, shortness of breath).    Close contacts should also follow these recommendations:   Make sure that you understand and can help the patient follow their healthcare provider's instructions for medication(s) and care. You should help the patient with basic needs in the home and provide support for getting groceries, prescriptions, and other personal needs.   Monitor the patient's symptoms. If the patient is getting sicker, call his or her healthcare provider and tell them that the patient has laboratory-confirmed COVID-19. This will help the healthcare provider's office take steps to keep other people in the office or waiting room from getting infected. Ask the healthcare provider to call the local or [facility] health department for additional guidance. If the patient has a medical emergency and you need to call 911, notify the dispatch personnel that the patient has, or is being evaluated for COVID-19.   Household members should stay in another room or be  from the patient as much as possible. Household members should use a separate bedroom and bathroom, if available.   Prohibit visitors who do not have an essential need to be in the home.   Household members should care for any pets in the home. Do not handle pets or other animals while sick.   Make sure that shared spaces in the home have good air flow, such as by an air conditioner or an opened window, weather  permitting.   Perform hand hygiene frequently. Wash your hands often with soap and water for at least 20 seconds or use an alcohol-based hand  that contains 60 to 95% alcohol, covering all surfaces of your hands and rubbing them together until they feel dry. Soap and water should be used preferentially if hands are visibly dirty.   Avoid touching your eyes, nose, and mouth with unwashed hands.   The patient should wear a facemask when you are around other people. If the patient is not able to wear a facemask (for example, because it causes trouble breathing), you, as the caregiver should wear a mask when you are in the same room as the patient.   Wear a disposable facemask and gloves when you touch or have contact with the patient's blood, stool, or body fluids, such as saliva, sputum, nasal mucus, vomit, urine.  o Throw out disposable facemasks and gloves after using them. Do not reuse.  o When removing personal protective equipment, first remove and dispose of gloves. Then, immediately clean your hands with soap and water or alcohol-based hand . Next, remove and dispose of facemask, and immediately clean your hands again with soap and water or alcohol-based hand .   Avoid sharing household items with the patient. You should not share dishes, drinking glasses, cups, eating utensils, towels, bedding, or other items. After the patient uses these items, you should wash them thoroughly (see below Wash laundry thoroughly).   Clean all high-touch surfaces, such as counters, tabletops, doorknobs, bathroom fixtures, toilets, phones, keyboards, tablets, and bedside tables, every day. Also, clean any surfaces that may have blood, stool, or body fluids on them.   Use a household cleaning spray or wipe, according to the label instructions. Labels contain instructions for safe and effective use of the cleaning product including precautions you should take when applying the product, such as  wearing gloves and making sure you have good ventilation during use of the product.   Wash laundry thoroughly.  o Immediately remove and wash clothes or bedding that have blood, stool, or body fluids on them.  o Wear disposable gloves while handling soiled items and keep soiled items away from your body. Clean your hands (with soap and water or an alcohol-based hand ) immediately after removing your gloves.  o Read and follow directions on labels of laundry or clothing items and detergent. In general, using a normal laundry detergent according to washing machine instructions and dry thoroughly using the warmest temperatures recommended on the clothing label.   Place all used disposable gloves, facemasks, and other contaminated items in a lined container before disposing of them with other household waste. Clean your hands (with soap and water or an alcohol-based hand ) immediately after handling these items. Soap and water should be used preferentially if hands are visibly dirty.   Discuss any additional questions with your [facility] or local health department or healthcare provider. Check available hours when contacting your local health department.    For more information see CDC link below.      https://www.cdc.gov/coronavirus/2019-ncov/hcp/guidance-prevent-spread.html#precautions        Sources:  [place], [place] [facility] and [facility]        If not allergic,take tylenol (acetominophen) for fever control, chills, or body aches every 4 hours. Do not exceed 4000 mg/ day.If not allergic, take Motrin (Ibuprofen) every 4 hours for fever, chills, pain or inflammation. Do not exceed 2400 mg/day. You can alternate taking tylenol and motrin.    If you were prescribed a narcotic medication, do not drive or operate heavy equipment or machinery while taking these medications.  You must understand that you've received an Urgent Care treatment only and that you may be released before all your  medical problems are known or treated. You, the patient, will arrange for follow up care as instructed.  Follow up with your PCP or specialty clinic as directed in the next 1-2 weeks if not improved or as needed.  You can call [phone] to schedule an appointment with the appropriate provider.  If your condition worsens we recommend that you receive another evaluation at the emergency room immediately or contact your primary medical clinics after hours call service to discuss your concerns.    Please return here or go to the Emergency Department for any concerns or worsening of condition.    If you have been referred to another provider and wish to call to check on the status of your referral, please call [facility] Scheduling at [phone]

## 2020-07-07 LAB — SARS-COV-2 RNA RESP QL NAA+PROBE: NOT DETECTED

## 2021-01-28 ENCOUNTER — OFFICE VISIT (OUTPATIENT)
Dept: URGENT CARE | Facility: CLINIC | Age: 36
End: 2021-01-28
Payer: COMMERCIAL

## 2021-01-28 VITALS
HEART RATE: 105 BPM | DIASTOLIC BLOOD PRESSURE: 101 MMHG | OXYGEN SATURATION: 97 % | HEIGHT: 67 IN | WEIGHT: 273 LBS | BODY MASS INDEX: 42.85 KG/M2 | SYSTOLIC BLOOD PRESSURE: 145 MMHG | TEMPERATURE: 98 F

## 2021-01-28 DIAGNOSIS — U07.1 COVID-19 VIRUS INFECTION: ICD-10-CM

## 2021-01-28 DIAGNOSIS — U07.1 COVID-19 VIRUS DETECTED: ICD-10-CM

## 2021-01-28 DIAGNOSIS — R09.81 SINUS CONGESTION: Primary | ICD-10-CM

## 2021-01-28 LAB
CTP QC/QA: YES
SARS-COV-2 RDRP RESP QL NAA+PROBE: POSITIVE

## 2021-01-28 PROCEDURE — 99214 PR OFFICE/OUTPT VISIT, EST, LEVL IV, 30-39 MIN: ICD-10-PCS | Mod: S$GLB,,, | Performed by: FAMILY MEDICINE

## 2021-01-28 PROCEDURE — 99214 OFFICE O/P EST MOD 30 MIN: CPT | Mod: S$GLB,,, | Performed by: FAMILY MEDICINE

## 2021-01-28 PROCEDURE — U0002 COVID-19 LAB TEST NON-CDC: HCPCS | Mod: QW,S$GLB,, | Performed by: FAMILY MEDICINE

## 2021-01-28 PROCEDURE — 3008F PR BODY MASS INDEX (BMI) DOCUMENTED: ICD-10-PCS | Mod: CPTII,S$GLB,, | Performed by: FAMILY MEDICINE

## 2021-01-28 PROCEDURE — 3008F BODY MASS INDEX DOCD: CPT | Mod: CPTII,S$GLB,, | Performed by: FAMILY MEDICINE

## 2021-01-28 PROCEDURE — U0002: ICD-10-PCS | Mod: QW,S$GLB,, | Performed by: FAMILY MEDICINE

## 2021-01-28 RX ORDER — PROMETHAZINE HYDROCHLORIDE 6.25 MG/5ML
SYRUP ORAL
Qty: 120 ML | Refills: 1 | Status: SHIPPED | OUTPATIENT
Start: 2021-01-28 | End: 2021-03-16

## 2021-01-28 RX ORDER — DESOGESTREL AND ETHINYL ESTRADIOL 0.15-0.03
KIT ORAL
COMMUNITY
Start: 2021-01-22

## 2021-01-28 RX ORDER — BENZONATATE 100 MG/1
100 CAPSULE ORAL EVERY 6 HOURS PRN
Qty: 30 CAPSULE | Refills: 1 | Status: SHIPPED | OUTPATIENT
Start: 2021-01-28 | End: 2021-03-16

## 2021-03-16 ENCOUNTER — OFFICE VISIT (OUTPATIENT)
Dept: FAMILY MEDICINE | Facility: CLINIC | Age: 36
End: 2021-03-16
Payer: COMMERCIAL

## 2021-03-16 DIAGNOSIS — M54.50 ACUTE MIDLINE LOW BACK PAIN WITHOUT SCIATICA: Primary | ICD-10-CM

## 2021-03-16 DIAGNOSIS — Z00.00 ENCOUNTER FOR BLOOD TEST FOR ROUTINE GENERAL PHYSICAL EXAMINATION: ICD-10-CM

## 2021-03-16 DIAGNOSIS — F51.05 INSOMNIA SECONDARY TO SITUATIONAL DEPRESSION: ICD-10-CM

## 2021-03-16 DIAGNOSIS — F43.21 INSOMNIA SECONDARY TO SITUATIONAL DEPRESSION: ICD-10-CM

## 2021-03-16 PROCEDURE — 99203 PR OFFICE/OUTPT VISIT, NEW, LEVL III, 30-44 MIN: ICD-10-PCS | Mod: 95,,, | Performed by: INTERNAL MEDICINE

## 2021-03-16 PROCEDURE — 99203 OFFICE O/P NEW LOW 30 MIN: CPT | Mod: 95,,, | Performed by: INTERNAL MEDICINE

## 2021-03-16 RX ORDER — TIZANIDINE 4 MG/1
4 TABLET ORAL NIGHTLY PRN
Qty: 30 TABLET | Refills: 1 | Status: SHIPPED | OUTPATIENT
Start: 2021-03-16 | End: 2021-05-19

## 2021-03-16 RX ORDER — PREDNISONE 10 MG/1
TABLET ORAL
Qty: 42 TABLET | Refills: 0 | Status: SHIPPED | OUTPATIENT
Start: 2021-03-16 | End: 2021-03-24

## 2021-03-18 ENCOUNTER — TELEPHONE (OUTPATIENT)
Dept: FAMILY MEDICINE | Facility: CLINIC | Age: 36
End: 2021-03-18

## 2021-03-23 ENCOUNTER — TELEPHONE (OUTPATIENT)
Dept: FAMILY MEDICINE | Facility: CLINIC | Age: 36
End: 2021-03-23

## 2021-03-24 ENCOUNTER — LAB VISIT (OUTPATIENT)
Dept: LAB | Facility: HOSPITAL | Age: 36
End: 2021-03-24
Attending: INTERNAL MEDICINE
Payer: COMMERCIAL

## 2021-03-24 ENCOUNTER — OFFICE VISIT (OUTPATIENT)
Dept: FAMILY MEDICINE | Facility: CLINIC | Age: 36
End: 2021-03-24
Payer: COMMERCIAL

## 2021-03-24 VITALS
WEIGHT: 293 LBS | HEART RATE: 91 BPM | OXYGEN SATURATION: 97 % | SYSTOLIC BLOOD PRESSURE: 130 MMHG | DIASTOLIC BLOOD PRESSURE: 88 MMHG | HEIGHT: 67 IN | BODY MASS INDEX: 45.99 KG/M2

## 2021-03-24 DIAGNOSIS — Z00.00 ANNUAL PHYSICAL EXAM: ICD-10-CM

## 2021-03-24 DIAGNOSIS — Z86.59 HISTORY OF ADHD: ICD-10-CM

## 2021-03-24 DIAGNOSIS — Z86.16 PERSONAL HISTORY OF COVID-19: ICD-10-CM

## 2021-03-24 DIAGNOSIS — Z83.3 FAMILY HISTORY OF DIABETES MELLITUS (DM): ICD-10-CM

## 2021-03-24 DIAGNOSIS — Z00.00 ENCOUNTER FOR BLOOD TEST FOR ROUTINE GENERAL PHYSICAL EXAMINATION: ICD-10-CM

## 2021-03-24 DIAGNOSIS — Z00.00 ANNUAL PHYSICAL EXAM: Primary | ICD-10-CM

## 2021-03-24 DIAGNOSIS — I10 ESSENTIAL HYPERTENSION: ICD-10-CM

## 2021-03-24 LAB
25(OH)D3+25(OH)D2 SERPL-MCNC: 11 NG/ML (ref 30–96)
ALBUMIN SERPL BCP-MCNC: 2.7 G/DL (ref 3.5–5.2)
ALP SERPL-CCNC: 93 U/L (ref 55–135)
ALT SERPL W/O P-5'-P-CCNC: 25 U/L (ref 10–44)
ANION GAP SERPL CALC-SCNC: 7 MMOL/L (ref 8–16)
AST SERPL-CCNC: 13 U/L (ref 10–40)
BASOPHILS # BLD AUTO: 0.03 K/UL (ref 0–0.2)
BASOPHILS NFR BLD: 0.3 % (ref 0–1.9)
BILIRUB SERPL-MCNC: 0.4 MG/DL (ref 0.1–1)
BUN SERPL-MCNC: 12 MG/DL (ref 6–20)
CALCIUM SERPL-MCNC: 8.1 MG/DL (ref 8.7–10.5)
CHLORIDE SERPL-SCNC: 103 MMOL/L (ref 95–110)
CHOLEST SERPL-MCNC: 189 MG/DL (ref 120–199)
CHOLEST/HDLC SERPL: 2.7 {RATIO} (ref 2–5)
CO2 SERPL-SCNC: 28 MMOL/L (ref 23–29)
CREAT SERPL-MCNC: 0.8 MG/DL (ref 0.5–1.4)
DIFFERENTIAL METHOD: ABNORMAL
EOSINOPHIL # BLD AUTO: 0.4 K/UL (ref 0–0.5)
EOSINOPHIL NFR BLD: 3.2 % (ref 0–8)
ERYTHROCYTE [DISTWIDTH] IN BLOOD BY AUTOMATED COUNT: 16.8 % (ref 11.5–14.5)
EST. GFR  (AFRICAN AMERICAN): >60 ML/MIN/1.73 M^2
EST. GFR  (NON AFRICAN AMERICAN): >60 ML/MIN/1.73 M^2
ESTIMATED AVG GLUCOSE: 117 MG/DL (ref 68–131)
GLUCOSE SERPL-MCNC: 75 MG/DL (ref 70–110)
HBA1C MFR BLD: 5.7 % (ref 4–5.6)
HCT VFR BLD AUTO: 38.3 % (ref 37–48.5)
HDLC SERPL-MCNC: 69 MG/DL (ref 40–75)
HDLC SERPL: 36.5 % (ref 20–50)
HGB BLD-MCNC: 12 G/DL (ref 12–16)
IMM GRANULOCYTES # BLD AUTO: 0.06 K/UL (ref 0–0.04)
IMM GRANULOCYTES NFR BLD AUTO: 0.5 % (ref 0–0.5)
LDLC SERPL CALC-MCNC: 88.8 MG/DL (ref 63–159)
LYMPHOCYTES # BLD AUTO: 2.3 K/UL (ref 1–4.8)
LYMPHOCYTES NFR BLD: 20 % (ref 18–48)
MCH RBC QN AUTO: 27.1 PG (ref 27–31)
MCHC RBC AUTO-ENTMCNC: 31.3 G/DL (ref 32–36)
MCV RBC AUTO: 87 FL (ref 82–98)
MONOCYTES # BLD AUTO: 0.8 K/UL (ref 0.3–1)
MONOCYTES NFR BLD: 7.2 % (ref 4–15)
NEUTROPHILS # BLD AUTO: 7.9 K/UL (ref 1.8–7.7)
NEUTROPHILS NFR BLD: 68.8 % (ref 38–73)
NONHDLC SERPL-MCNC: 120 MG/DL
NRBC BLD-RTO: 0 /100 WBC
PLATELET # BLD AUTO: 233 K/UL (ref 150–350)
PMV BLD AUTO: 12.8 FL (ref 9.2–12.9)
POTASSIUM SERPL-SCNC: 4 MMOL/L (ref 3.5–5.1)
PROT SERPL-MCNC: 7 G/DL (ref 6–8.4)
RBC # BLD AUTO: 4.43 M/UL (ref 4–5.4)
SODIUM SERPL-SCNC: 138 MMOL/L (ref 136–145)
TRIGL SERPL-MCNC: 156 MG/DL (ref 30–150)
TSH SERPL DL<=0.005 MIU/L-ACNC: 1.97 UIU/ML (ref 0.4–4)
WBC # BLD AUTO: 11.42 K/UL (ref 3.9–12.7)

## 2021-03-24 PROCEDURE — 3075F SYST BP GE 130 - 139MM HG: CPT | Mod: CPTII,S$GLB,, | Performed by: NURSE PRACTITIONER

## 2021-03-24 PROCEDURE — 82306 VITAMIN D 25 HYDROXY: CPT | Performed by: NURSE PRACTITIONER

## 2021-03-24 PROCEDURE — 99999 PR PBB SHADOW E&M-EST. PATIENT-LVL IV: CPT | Mod: PBBFAC,,, | Performed by: NURSE PRACTITIONER

## 2021-03-24 PROCEDURE — 86803 HEPATITIS C AB TEST: CPT | Performed by: INTERNAL MEDICINE

## 2021-03-24 PROCEDURE — 3075F PR MOST RECENT SYSTOLIC BLOOD PRESS GE 130-139MM HG: ICD-10-PCS | Mod: CPTII,S$GLB,, | Performed by: NURSE PRACTITIONER

## 2021-03-24 PROCEDURE — 80053 COMPREHEN METABOLIC PANEL: CPT | Performed by: NURSE PRACTITIONER

## 2021-03-24 PROCEDURE — 80061 LIPID PANEL: CPT | Performed by: NURSE PRACTITIONER

## 2021-03-24 PROCEDURE — 1126F PR PAIN SEVERITY QUANTIFIED, NO PAIN PRESENT: ICD-10-PCS | Mod: S$GLB,,, | Performed by: NURSE PRACTITIONER

## 2021-03-24 PROCEDURE — 99999 PR PBB SHADOW E&M-EST. PATIENT-LVL IV: ICD-10-PCS | Mod: PBBFAC,,, | Performed by: NURSE PRACTITIONER

## 2021-03-24 PROCEDURE — 86703 HIV-1/HIV-2 1 RESULT ANTBDY: CPT | Performed by: INTERNAL MEDICINE

## 2021-03-24 PROCEDURE — 3079F DIAST BP 80-89 MM HG: CPT | Mod: CPTII,S$GLB,, | Performed by: NURSE PRACTITIONER

## 2021-03-24 PROCEDURE — 1126F AMNT PAIN NOTED NONE PRSNT: CPT | Mod: S$GLB,,, | Performed by: NURSE PRACTITIONER

## 2021-03-24 PROCEDURE — 3008F PR BODY MASS INDEX (BMI) DOCUMENTED: ICD-10-PCS | Mod: CPTII,S$GLB,, | Performed by: NURSE PRACTITIONER

## 2021-03-24 PROCEDURE — 85025 COMPLETE CBC W/AUTO DIFF WBC: CPT | Performed by: NURSE PRACTITIONER

## 2021-03-24 PROCEDURE — 99395 PR PREVENTIVE VISIT,EST,18-39: ICD-10-PCS | Mod: S$GLB,,, | Performed by: NURSE PRACTITIONER

## 2021-03-24 PROCEDURE — 84443 ASSAY THYROID STIM HORMONE: CPT | Performed by: NURSE PRACTITIONER

## 2021-03-24 PROCEDURE — 99395 PREV VISIT EST AGE 18-39: CPT | Mod: S$GLB,,, | Performed by: NURSE PRACTITIONER

## 2021-03-24 PROCEDURE — 83036 HEMOGLOBIN GLYCOSYLATED A1C: CPT | Performed by: NURSE PRACTITIONER

## 2021-03-24 PROCEDURE — 3008F BODY MASS INDEX DOCD: CPT | Mod: CPTII,S$GLB,, | Performed by: NURSE PRACTITIONER

## 2021-03-24 PROCEDURE — 3079F PR MOST RECENT DIASTOLIC BLOOD PRESSURE 80-89 MM HG: ICD-10-PCS | Mod: CPTII,S$GLB,, | Performed by: NURSE PRACTITIONER

## 2021-03-24 PROCEDURE — 36415 COLL VENOUS BLD VENIPUNCTURE: CPT | Mod: PO | Performed by: NURSE PRACTITIONER

## 2021-03-24 RX ORDER — BENZONATATE 100 MG/1
CAPSULE ORAL 3 TIMES DAILY PRN
COMMUNITY
Start: 2021-03-16 | End: 2021-07-06

## 2021-03-24 RX ORDER — LISINOPRIL 5 MG/1
5 TABLET ORAL DAILY
Qty: 90 TABLET | Refills: 1 | Status: SHIPPED | OUTPATIENT
Start: 2021-03-24 | End: 2021-09-28

## 2021-03-25 LAB — HIV 1+2 AB+HIV1 P24 AG SERPL QL IA: NEGATIVE

## 2021-03-26 LAB — HCV AB SERPL QL IA: NEGATIVE

## 2021-03-29 DIAGNOSIS — E55.9 VITAMIN D INSUFFICIENCY: Primary | ICD-10-CM

## 2021-03-29 RX ORDER — ERGOCALCIFEROL 1.25 MG/1
50000 CAPSULE ORAL
Qty: 12 CAPSULE | Refills: 1 | Status: SHIPPED | OUTPATIENT
Start: 2021-03-29 | End: 2021-09-08

## 2021-03-30 ENCOUNTER — TELEPHONE (OUTPATIENT)
Dept: FAMILY MEDICINE | Facility: CLINIC | Age: 36
End: 2021-03-30

## 2021-04-01 ENCOUNTER — OFFICE VISIT (OUTPATIENT)
Dept: FAMILY MEDICINE | Facility: CLINIC | Age: 36
End: 2021-04-01
Payer: COMMERCIAL

## 2021-04-01 DIAGNOSIS — Z71.2 ENCOUNTER TO DISCUSS TEST RESULTS: Primary | ICD-10-CM

## 2021-04-01 DIAGNOSIS — E55.9 VITAMIN D DEFICIENCY: ICD-10-CM

## 2021-04-01 DIAGNOSIS — R73.03 PRE-DIABETES: ICD-10-CM

## 2021-04-01 PROCEDURE — 99213 PR OFFICE/OUTPT VISIT, EST, LEVL III, 20-29 MIN: ICD-10-PCS | Mod: 95,,, | Performed by: NURSE PRACTITIONER

## 2021-04-01 PROCEDURE — 99213 OFFICE O/P EST LOW 20 MIN: CPT | Mod: 95,,, | Performed by: NURSE PRACTITIONER

## 2021-04-03 PROBLEM — R73.03 PRE-DIABETES: Status: ACTIVE | Noted: 2021-04-03

## 2021-04-03 PROBLEM — E55.9 VITAMIN D DEFICIENCY: Status: ACTIVE | Noted: 2021-04-03

## 2021-05-10 ENCOUNTER — PATIENT MESSAGE (OUTPATIENT)
Dept: RESEARCH | Facility: HOSPITAL | Age: 36
End: 2021-05-10

## 2021-06-01 ENCOUNTER — OFFICE VISIT (OUTPATIENT)
Dept: FAMILY MEDICINE | Facility: CLINIC | Age: 36
End: 2021-06-01
Payer: COMMERCIAL

## 2021-06-01 VITALS
OXYGEN SATURATION: 98 % | HEIGHT: 67 IN | SYSTOLIC BLOOD PRESSURE: 122 MMHG | WEIGHT: 293 LBS | DIASTOLIC BLOOD PRESSURE: 88 MMHG | HEART RATE: 86 BPM | BODY MASS INDEX: 45.99 KG/M2 | TEMPERATURE: 99 F

## 2021-06-01 DIAGNOSIS — E83.51 HYPOCALCEMIA: ICD-10-CM

## 2021-06-01 DIAGNOSIS — E88.09 HYPOALBUMINEMIA: ICD-10-CM

## 2021-06-01 DIAGNOSIS — E66.01 CLASS 3 SEVERE OBESITY DUE TO EXCESS CALORIES WITH SERIOUS COMORBIDITY AND BODY MASS INDEX (BMI) OF 45.0 TO 49.9 IN ADULT: ICD-10-CM

## 2021-06-01 DIAGNOSIS — E55.9 VITAMIN D DEFICIENCY: ICD-10-CM

## 2021-06-01 DIAGNOSIS — R73.03 PRE-DIABETES: ICD-10-CM

## 2021-06-01 DIAGNOSIS — E78.49 OTHER HYPERLIPIDEMIA: ICD-10-CM

## 2021-06-01 DIAGNOSIS — I10 ESSENTIAL HYPERTENSION: Primary | ICD-10-CM

## 2021-06-01 PROCEDURE — 3079F PR MOST RECENT DIASTOLIC BLOOD PRESSURE 80-89 MM HG: ICD-10-PCS | Mod: CPTII,S$GLB,, | Performed by: FAMILY MEDICINE

## 2021-06-01 PROCEDURE — 3074F PR MOST RECENT SYSTOLIC BLOOD PRESSURE < 130 MM HG: ICD-10-PCS | Mod: CPTII,S$GLB,, | Performed by: FAMILY MEDICINE

## 2021-06-01 PROCEDURE — 3008F PR BODY MASS INDEX (BMI) DOCUMENTED: ICD-10-PCS | Mod: CPTII,S$GLB,, | Performed by: FAMILY MEDICINE

## 2021-06-01 PROCEDURE — 99999 PR PBB SHADOW E&M-EST. PATIENT-LVL III: CPT | Mod: PBBFAC,,, | Performed by: FAMILY MEDICINE

## 2021-06-01 PROCEDURE — 3079F DIAST BP 80-89 MM HG: CPT | Mod: CPTII,S$GLB,, | Performed by: FAMILY MEDICINE

## 2021-06-01 PROCEDURE — 3008F BODY MASS INDEX DOCD: CPT | Mod: CPTII,S$GLB,, | Performed by: FAMILY MEDICINE

## 2021-06-01 PROCEDURE — 99999 PR PBB SHADOW E&M-EST. PATIENT-LVL III: ICD-10-PCS | Mod: PBBFAC,,, | Performed by: FAMILY MEDICINE

## 2021-06-01 PROCEDURE — 1126F AMNT PAIN NOTED NONE PRSNT: CPT | Mod: S$GLB,,, | Performed by: FAMILY MEDICINE

## 2021-06-01 PROCEDURE — 99214 OFFICE O/P EST MOD 30 MIN: CPT | Mod: S$GLB,,, | Performed by: FAMILY MEDICINE

## 2021-06-01 PROCEDURE — 1126F PR PAIN SEVERITY QUANTIFIED, NO PAIN PRESENT: ICD-10-PCS | Mod: S$GLB,,, | Performed by: FAMILY MEDICINE

## 2021-06-01 PROCEDURE — 99214 PR OFFICE/OUTPT VISIT, EST, LEVL IV, 30-39 MIN: ICD-10-PCS | Mod: S$GLB,,, | Performed by: FAMILY MEDICINE

## 2021-06-01 PROCEDURE — 3074F SYST BP LT 130 MM HG: CPT | Mod: CPTII,S$GLB,, | Performed by: FAMILY MEDICINE

## 2021-06-14 ENCOUNTER — DOCUMENTATION ONLY (OUTPATIENT)
Dept: FAMILY MEDICINE | Facility: CLINIC | Age: 36
End: 2021-06-14

## 2021-07-06 ENCOUNTER — OFFICE VISIT (OUTPATIENT)
Dept: FAMILY MEDICINE | Facility: CLINIC | Age: 36
End: 2021-07-06
Payer: COMMERCIAL

## 2021-07-06 DIAGNOSIS — K29.00 OTHER ACUTE GASTRITIS WITHOUT HEMORRHAGE: Primary | ICD-10-CM

## 2021-07-06 PROCEDURE — 99213 PR OFFICE/OUTPT VISIT, EST, LEVL III, 20-29 MIN: ICD-10-PCS | Mod: 95,,, | Performed by: INTERNAL MEDICINE

## 2021-07-06 PROCEDURE — 99213 OFFICE O/P EST LOW 20 MIN: CPT | Mod: 95,,, | Performed by: INTERNAL MEDICINE

## 2021-07-06 RX ORDER — PANTOPRAZOLE SODIUM 40 MG/1
40 TABLET, DELAYED RELEASE ORAL DAILY
Qty: 30 TABLET | Refills: 11 | Status: SHIPPED | OUTPATIENT
Start: 2021-07-06 | End: 2022-07-22 | Stop reason: SDUPTHER

## 2021-09-22 ENCOUNTER — INFUSION (OUTPATIENT)
Dept: INFECTIOUS DISEASES | Facility: HOSPITAL | Age: 36
End: 2021-09-22
Attending: STUDENT IN AN ORGANIZED HEALTH CARE EDUCATION/TRAINING PROGRAM
Payer: COMMERCIAL

## 2021-09-22 VITALS
RESPIRATION RATE: 19 BRPM | HEART RATE: 101 BPM | OXYGEN SATURATION: 95 % | DIASTOLIC BLOOD PRESSURE: 77 MMHG | TEMPERATURE: 98 F | SYSTOLIC BLOOD PRESSURE: 120 MMHG

## 2021-09-22 DIAGNOSIS — U07.1 COVID-19: ICD-10-CM

## 2021-09-22 PROCEDURE — 63600175 PHARM REV CODE 636 W HCPCS: Performed by: STUDENT IN AN ORGANIZED HEALTH CARE EDUCATION/TRAINING PROGRAM

## 2021-09-22 PROCEDURE — 25000003 PHARM REV CODE 250: Performed by: STUDENT IN AN ORGANIZED HEALTH CARE EDUCATION/TRAINING PROGRAM

## 2021-09-22 PROCEDURE — M0243 CASIRIVI AND IMDEVI INFUSION: HCPCS | Performed by: STUDENT IN AN ORGANIZED HEALTH CARE EDUCATION/TRAINING PROGRAM

## 2021-09-22 RX ORDER — ACETAMINOPHEN 325 MG/1
650 TABLET ORAL ONCE AS NEEDED
Status: DISCONTINUED | OUTPATIENT
Start: 2021-09-22 | End: 2022-05-05

## 2021-09-22 RX ORDER — ALBUTEROL SULFATE 90 UG/1
2 AEROSOL, METERED RESPIRATORY (INHALATION)
Status: DISCONTINUED | OUTPATIENT
Start: 2021-09-22 | End: 2022-05-05

## 2021-09-22 RX ORDER — ONDANSETRON 4 MG/1
4 TABLET, ORALLY DISINTEGRATING ORAL ONCE AS NEEDED
Status: DISCONTINUED | OUTPATIENT
Start: 2021-09-22 | End: 2022-05-05

## 2021-09-22 RX ORDER — EPINEPHRINE 0.3 MG/.3ML
0.3 INJECTION SUBCUTANEOUS
Status: DISCONTINUED | OUTPATIENT
Start: 2021-09-22 | End: 2022-05-05

## 2021-09-22 RX ORDER — DIPHENHYDRAMINE HYDROCHLORIDE 50 MG/ML
25 INJECTION INTRAMUSCULAR; INTRAVENOUS ONCE AS NEEDED
Status: DISCONTINUED | OUTPATIENT
Start: 2021-09-22 | End: 2022-05-05

## 2021-09-22 RX ORDER — SODIUM CHLORIDE 0.9 % (FLUSH) 0.9 %
10 SYRINGE (ML) INJECTION
Status: DISCONTINUED | OUTPATIENT
Start: 2021-09-22 | End: 2022-05-05

## 2021-09-22 RX ADMIN — CASIRIVIMAB AND IMDEVIMAB 600 MG: 600; 600 INJECTION, SOLUTION, CONCENTRATE INTRAVENOUS at 11:09

## 2022-01-28 ENCOUNTER — CLINICAL SUPPORT (OUTPATIENT)
Dept: OTHER | Facility: CLINIC | Age: 37
End: 2022-01-28
Payer: COMMERCIAL

## 2022-01-28 DIAGNOSIS — Z00.8 ENCOUNTER FOR OTHER GENERAL EXAMINATION: ICD-10-CM

## 2022-02-02 VITALS — BODY MASS INDEX: 46.58 KG/M2 | HEIGHT: 67 IN

## 2022-02-02 LAB
GLUCOSE SERPL-MCNC: 98 MG/DL (ref 60–140)
HDLC SERPL-MCNC: 60 MG/DL
POC CHOLESTEROL, LDL (DOCK): 101 MG/DL
POC CHOLESTEROL, TOTAL: 178 MG/DL
TRIGL SERPL-MCNC: 86 MG/DL

## 2022-05-05 ENCOUNTER — OFFICE VISIT (OUTPATIENT)
Dept: FAMILY MEDICINE | Facility: CLINIC | Age: 37
End: 2022-05-05
Payer: COMMERCIAL

## 2022-05-05 DIAGNOSIS — J30.9 CHRONIC ALLERGIC RHINITIS: Primary | ICD-10-CM

## 2022-05-05 DIAGNOSIS — Z00.00 LABORATORY EXAMINATION ORDERED AS PART OF A COMPLETE PHYSICAL EXAMINATION: ICD-10-CM

## 2022-05-05 PROCEDURE — 99213 OFFICE O/P EST LOW 20 MIN: CPT | Mod: 95,,, | Performed by: INTERNAL MEDICINE

## 2022-05-05 PROCEDURE — 99213 PR OFFICE/OUTPT VISIT, EST, LEVL III, 20-29 MIN: ICD-10-PCS | Mod: 95,,, | Performed by: INTERNAL MEDICINE

## 2022-05-05 RX ORDER — LEVOCETIRIZINE DIHYDROCHLORIDE 5 MG/1
5 TABLET, FILM COATED ORAL NIGHTLY
Qty: 90 TABLET | Refills: 1 | Status: SHIPPED | OUTPATIENT
Start: 2022-05-05 | End: 2023-09-20

## 2022-05-05 RX ORDER — AZELASTINE 1 MG/ML
1 SPRAY, METERED NASAL 2 TIMES DAILY
Qty: 30 ML | Refills: 6 | Status: SHIPPED | OUTPATIENT
Start: 2022-05-05 | End: 2023-05-09

## 2022-05-05 NOTE — PROGRESS NOTES
Subjective:       Patient ID: Joyce Dumont is a 37 y.o. female.    Medication List with Changes/Refills   New Medications    AZELASTINE (ASTELIN) 137 MCG (0.1 %) NASAL SPRAY    1 spray (137 mcg total) by Nasal route 2 (two) times daily.    LEVOCETIRIZINE (XYZAL) 5 MG TABLET    Take 1 tablet (5 mg total) by mouth every evening.   Current Medications    ERGOCALCIFEROL (ERGOCALCIFEROL) 50,000 UNIT CAP    TAKE 1 CAPSULE BY MOUTH EVERY 7 DAYS    ISIBLOOM 0.15-0.03 MG PER TABLET        LISINOPRIL (PRINIVIL,ZESTRIL) 5 MG TABLET    TAKE 1 TABLET(5 MG) BY MOUTH EVERY DAY    NALTREXONE-BUPROPION (CONTRAVE) 8-90 MG TBSR    1 tab PO qam x 1 wk, then 1 tab PO BID x1 wk, then 2 tabs PO qam and 1 tab PO qpm for 1 week, then 2 tabs BID    NORETHINDRONE AC-ETH ESTRADIOL (FEMHRT LOW DOSE) 0.5-2.5 MG-MCG PER TABLET    Take 1 tablet by mouth once daily.    PANTOPRAZOLE (PROTONIX) 40 MG TABLET    Take 1 tablet (40 mg total) by mouth once daily.    TIZANIDINE (ZANAFLEX) 4 MG TABLET    TAKE 1 TABLET(4 MG) BY MOUTH EVERY NIGHT AS NEEDED FOR LOWER BACK PAIN       Chief Complaint: No chief complaint on file.  The patient location is: work   The chief complaint leading to consultation is: runny nose     Visit type: audiovisual    Face to Face time with patient: 10 minutes   10 minutes of total time spent on the encounter, which includes face to face time and non-face to face time preparing to see the patient (eg, review of tests), Obtaining and/or reviewing separately obtained history, Documenting clinical information in the electronic or other health record, Independently interpreting results (not separately reported) and communicating results to the patient/family/caregiver, or Care coordination (not separately reported).     Each patient to whom he or she provides medical services by telemedicine is:  (1) informed of the relationship between the physician and patient and the respective role of any other health care provider with  "respect to management of the patient; and (2) notified that he or she may decline to receive medical services by telemedicine and may withdraw from such care at any time.    Notes:   She has hypertension, hyperlipidemia, insulin resistance and obesity.     She reports 2 months of intermittent runny nose and congestion. It is clear and causes sneezing. No sore throat or ear pain. No coughing. No wheezing. No sinus pressure or headaches. Her nose runs clear all day. She has used OTC mucinex, advil congestion, allegra D and afrin regularly without relief.  She does have a history of allergies which she feels are "bad".      Review of Systems   Constitutional: Negative for activity change, appetite change, chills, fatigue, fever and unexpected weight change.   HENT: Positive for congestion, rhinorrhea and sinus pressure. Negative for ear discharge, ear pain, hearing loss, mouth sores, postnasal drip, sore throat and trouble swallowing.    Eyes: Negative for pain, discharge, redness and visual disturbance.   Respiratory: Negative for cough, chest tightness, shortness of breath and wheezing.    Cardiovascular: Negative for chest pain and palpitations.   Gastrointestinal: Negative for abdominal pain, blood in stool, constipation, diarrhea, nausea and vomiting.   Endocrine: Negative for polydipsia and polyuria.   Genitourinary: Negative for difficulty urinating, dysuria, hematuria and menstrual problem.   Musculoskeletal: Negative for arthralgias, joint swelling, neck pain and neck stiffness.   Skin: Negative for rash.   Neurological: Negative for weakness and headaches.   Hematological: Negative for adenopathy.   Psychiatric/Behavioral: Negative for confusion and dysphoric mood.       Objective:      There were no vitals filed for this visit.  There is no height or weight on file to calculate BMI.  Physical Exam    Alert, no distress  No respiratory distress   Assessment:       1. Chronic allergic rhinitis    2. Laboratory " examination ordered as part of a complete physical examination        Plan:       Chronic allergic rhinitis  Uncontrolled. Advised to stop afrin because if cases rebound congestion.  Will start nasal astelin and oral antihistamines. Recheck with me in 3 weeks for physical.  Discussed signs of infection to let me know.   -     azelastine (ASTELIN) 137 mcg (0.1 %) nasal spray; 1 spray (137 mcg total) by Nasal route 2 (two) times daily.  Dispense: 30 mL; Refill: 6  -     levocetirizine (XYZAL) 5 MG tablet; Take 1 tablet (5 mg total) by mouth every evening.  Dispense: 90 tablet; Refill: 1    Laboratory examination ordered as part of a complete physical examination  -     CBC Auto Differential; Future; Expected date: 05/05/2022  -     Comprehensive Metabolic Panel; Future; Expected date: 05/05/2022  -     Lipid Panel; Future; Expected date: 05/05/2022  -     TSH; Future; Expected date: 05/05/2022  -     Hemoglobin A1C; Future; Expected date: 05/05/2022    Follow up in about 3 weeks (around 5/26/2022) for to establish care and recheck congestion .

## 2022-05-16 ENCOUNTER — LAB VISIT (OUTPATIENT)
Dept: LAB | Facility: HOSPITAL | Age: 37
End: 2022-05-16
Attending: INTERNAL MEDICINE
Payer: COMMERCIAL

## 2022-05-16 DIAGNOSIS — Z00.00 LABORATORY EXAMINATION ORDERED AS PART OF A COMPLETE PHYSICAL EXAMINATION: ICD-10-CM

## 2022-05-16 LAB
ALBUMIN SERPL BCP-MCNC: 2.7 G/DL (ref 3.5–5.2)
ALP SERPL-CCNC: 93 U/L (ref 55–135)
ALT SERPL W/O P-5'-P-CCNC: 21 U/L (ref 10–44)
ANION GAP SERPL CALC-SCNC: 9 MMOL/L (ref 8–16)
AST SERPL-CCNC: 24 U/L (ref 10–40)
BASOPHILS # BLD AUTO: 0.04 K/UL (ref 0–0.2)
BASOPHILS NFR BLD: 0.5 % (ref 0–1.9)
BILIRUB SERPL-MCNC: 0.2 MG/DL (ref 0.1–1)
BUN SERPL-MCNC: 14 MG/DL (ref 6–20)
CALCIUM SERPL-MCNC: 8.7 MG/DL (ref 8.7–10.5)
CHLORIDE SERPL-SCNC: 106 MMOL/L (ref 95–110)
CHOLEST SERPL-MCNC: 176 MG/DL (ref 120–199)
CHOLEST/HDLC SERPL: 2.8 {RATIO} (ref 2–5)
CO2 SERPL-SCNC: 24 MMOL/L (ref 23–29)
CREAT SERPL-MCNC: 0.8 MG/DL (ref 0.5–1.4)
DIFFERENTIAL METHOD: ABNORMAL
EOSINOPHIL # BLD AUTO: 0.3 K/UL (ref 0–0.5)
EOSINOPHIL NFR BLD: 3.1 % (ref 0–8)
ERYTHROCYTE [DISTWIDTH] IN BLOOD BY AUTOMATED COUNT: 15.3 % (ref 11.5–14.5)
EST. GFR  (AFRICAN AMERICAN): >60 ML/MIN/1.73 M^2
EST. GFR  (NON AFRICAN AMERICAN): >60 ML/MIN/1.73 M^2
ESTIMATED AVG GLUCOSE: 105 MG/DL (ref 68–131)
GLUCOSE SERPL-MCNC: 87 MG/DL (ref 70–110)
HBA1C MFR BLD: 5.3 % (ref 4–5.6)
HCT VFR BLD AUTO: 34.6 % (ref 37–48.5)
HDLC SERPL-MCNC: 63 MG/DL (ref 40–75)
HDLC SERPL: 35.8 % (ref 20–50)
HGB BLD-MCNC: 10.8 G/DL (ref 12–16)
IMM GRANULOCYTES # BLD AUTO: 0.02 K/UL (ref 0–0.04)
IMM GRANULOCYTES NFR BLD AUTO: 0.2 % (ref 0–0.5)
LDLC SERPL CALC-MCNC: 95.4 MG/DL (ref 63–159)
LYMPHOCYTES # BLD AUTO: 2.1 K/UL (ref 1–4.8)
LYMPHOCYTES NFR BLD: 24.2 % (ref 18–48)
MCH RBC QN AUTO: 26.3 PG (ref 27–31)
MCHC RBC AUTO-ENTMCNC: 31.2 G/DL (ref 32–36)
MCV RBC AUTO: 84 FL (ref 82–98)
MONOCYTES # BLD AUTO: 0.6 K/UL (ref 0.3–1)
MONOCYTES NFR BLD: 7.3 % (ref 4–15)
NEUTROPHILS # BLD AUTO: 5.6 K/UL (ref 1.8–7.7)
NEUTROPHILS NFR BLD: 64.7 % (ref 38–73)
NONHDLC SERPL-MCNC: 113 MG/DL
NRBC BLD-RTO: 0 /100 WBC
PLATELET # BLD AUTO: 270 K/UL (ref 150–450)
PMV BLD AUTO: 12.2 FL (ref 9.2–12.9)
POTASSIUM SERPL-SCNC: 4.4 MMOL/L (ref 3.5–5.1)
PROT SERPL-MCNC: 6.9 G/DL (ref 6–8.4)
RBC # BLD AUTO: 4.11 M/UL (ref 4–5.4)
SODIUM SERPL-SCNC: 139 MMOL/L (ref 136–145)
TRIGL SERPL-MCNC: 88 MG/DL (ref 30–150)
TSH SERPL DL<=0.005 MIU/L-ACNC: 3.66 UIU/ML (ref 0.4–4)
WBC # BLD AUTO: 8.72 K/UL (ref 3.9–12.7)

## 2022-05-16 PROCEDURE — 84443 ASSAY THYROID STIM HORMONE: CPT | Performed by: INTERNAL MEDICINE

## 2022-05-16 PROCEDURE — 36415 COLL VENOUS BLD VENIPUNCTURE: CPT | Mod: PO | Performed by: INTERNAL MEDICINE

## 2022-05-16 PROCEDURE — 80053 COMPREHEN METABOLIC PANEL: CPT | Performed by: INTERNAL MEDICINE

## 2022-05-16 PROCEDURE — 80061 LIPID PANEL: CPT | Performed by: INTERNAL MEDICINE

## 2022-05-16 PROCEDURE — 85025 COMPLETE CBC W/AUTO DIFF WBC: CPT | Performed by: INTERNAL MEDICINE

## 2022-05-16 PROCEDURE — 83036 HEMOGLOBIN GLYCOSYLATED A1C: CPT | Performed by: INTERNAL MEDICINE

## 2022-05-23 ENCOUNTER — OFFICE VISIT (OUTPATIENT)
Dept: FAMILY MEDICINE | Facility: CLINIC | Age: 37
End: 2022-05-23
Payer: COMMERCIAL

## 2022-05-23 ENCOUNTER — TELEPHONE (OUTPATIENT)
Dept: GASTROENTEROLOGY | Facility: CLINIC | Age: 37
End: 2022-05-23
Payer: COMMERCIAL

## 2022-05-23 VITALS
WEIGHT: 293 LBS | SYSTOLIC BLOOD PRESSURE: 136 MMHG | OXYGEN SATURATION: 97 % | HEART RATE: 95 BPM | HEIGHT: 67 IN | BODY MASS INDEX: 45.99 KG/M2 | RESPIRATION RATE: 18 BRPM | DIASTOLIC BLOOD PRESSURE: 84 MMHG

## 2022-05-23 DIAGNOSIS — K21.9 GASTROESOPHAGEAL REFLUX DISEASE WITHOUT ESOPHAGITIS: ICD-10-CM

## 2022-05-23 DIAGNOSIS — E88.819 INSULIN RESISTANCE: ICD-10-CM

## 2022-05-23 DIAGNOSIS — J30.9 CHRONIC ALLERGIC RHINITIS: ICD-10-CM

## 2022-05-23 DIAGNOSIS — I10 PRIMARY HYPERTENSION: ICD-10-CM

## 2022-05-23 DIAGNOSIS — Z00.00 WELL ADULT EXAM: Primary | ICD-10-CM

## 2022-05-23 DIAGNOSIS — D64.9 ANEMIA, UNSPECIFIED TYPE: ICD-10-CM

## 2022-05-23 DIAGNOSIS — E55.9 VITAMIN D DEFICIENCY: ICD-10-CM

## 2022-05-23 DIAGNOSIS — E66.01 MORBID OBESITY WITH BMI OF 45.0-49.9, ADULT: ICD-10-CM

## 2022-05-23 LAB
BASOPHILS # BLD AUTO: 0.04 K/UL (ref 0–0.2)
BASOPHILS NFR BLD: 0.4 % (ref 0–1.9)
DIFFERENTIAL METHOD: ABNORMAL
EOSINOPHIL # BLD AUTO: 0.2 K/UL (ref 0–0.5)
EOSINOPHIL NFR BLD: 2.6 % (ref 0–8)
ERYTHROCYTE [DISTWIDTH] IN BLOOD BY AUTOMATED COUNT: 14.8 % (ref 11.5–14.5)
FERRITIN SERPL-MCNC: 39 NG/ML (ref 20–300)
HCT VFR BLD AUTO: 35 % (ref 37–48.5)
HGB BLD-MCNC: 11 G/DL (ref 12–16)
IMM GRANULOCYTES # BLD AUTO: 0.02 K/UL (ref 0–0.04)
IMM GRANULOCYTES NFR BLD AUTO: 0.2 % (ref 0–0.5)
IRON SERPL-MCNC: 47 UG/DL (ref 30–160)
LYMPHOCYTES # BLD AUTO: 1.6 K/UL (ref 1–4.8)
LYMPHOCYTES NFR BLD: 17.7 % (ref 18–48)
MCH RBC QN AUTO: 26.4 PG (ref 27–31)
MCHC RBC AUTO-ENTMCNC: 31.4 G/DL (ref 32–36)
MCV RBC AUTO: 84 FL (ref 82–98)
MONOCYTES # BLD AUTO: 0.6 K/UL (ref 0.3–1)
MONOCYTES NFR BLD: 6 % (ref 4–15)
NEUTROPHILS # BLD AUTO: 6.7 K/UL (ref 1.8–7.7)
NEUTROPHILS NFR BLD: 73.1 % (ref 38–73)
NRBC BLD-RTO: 0 /100 WBC
PLATELET # BLD AUTO: 253 K/UL (ref 150–450)
PMV BLD AUTO: 12 FL (ref 9.2–12.9)
RBC # BLD AUTO: 4.17 M/UL (ref 4–5.4)
SATURATED IRON: 11 % (ref 20–50)
TOTAL IRON BINDING CAPACITY: 429 UG/DL (ref 250–450)
TRANSFERRIN SERPL-MCNC: 290 MG/DL (ref 200–375)
WBC # BLD AUTO: 9.22 K/UL (ref 3.9–12.7)

## 2022-05-23 PROCEDURE — 3075F SYST BP GE 130 - 139MM HG: CPT | Mod: CPTII,S$GLB,, | Performed by: INTERNAL MEDICINE

## 2022-05-23 PROCEDURE — 1159F PR MEDICATION LIST DOCUMENTED IN MEDICAL RECORD: ICD-10-PCS | Mod: CPTII,S$GLB,, | Performed by: INTERNAL MEDICINE

## 2022-05-23 PROCEDURE — 1160F PR REVIEW ALL MEDS BY PRESCRIBER/CLIN PHARMACIST DOCUMENTED: ICD-10-PCS | Mod: CPTII,S$GLB,, | Performed by: INTERNAL MEDICINE

## 2022-05-23 PROCEDURE — 36415 COLL VENOUS BLD VENIPUNCTURE: CPT | Performed by: INTERNAL MEDICINE

## 2022-05-23 PROCEDURE — 1160F RVW MEDS BY RX/DR IN RCRD: CPT | Mod: CPTII,S$GLB,, | Performed by: INTERNAL MEDICINE

## 2022-05-23 PROCEDURE — 90471 IMMUNIZATION ADMIN: CPT | Mod: S$GLB,,, | Performed by: INTERNAL MEDICINE

## 2022-05-23 PROCEDURE — 90715 TDAP VACCINE 7 YRS/> IM: CPT | Mod: S$GLB,,, | Performed by: INTERNAL MEDICINE

## 2022-05-23 PROCEDURE — 99395 PR PREVENTIVE VISIT,EST,18-39: ICD-10-PCS | Mod: 25,S$GLB,, | Performed by: INTERNAL MEDICINE

## 2022-05-23 PROCEDURE — 90715 TDAP VACCINE GREATER THAN OR EQUAL TO 7YO IM: ICD-10-PCS | Mod: S$GLB,,, | Performed by: INTERNAL MEDICINE

## 2022-05-23 PROCEDURE — 3079F DIAST BP 80-89 MM HG: CPT | Mod: CPTII,S$GLB,, | Performed by: INTERNAL MEDICINE

## 2022-05-23 PROCEDURE — 1159F MED LIST DOCD IN RCRD: CPT | Mod: CPTII,S$GLB,, | Performed by: INTERNAL MEDICINE

## 2022-05-23 PROCEDURE — 99395 PREV VISIT EST AGE 18-39: CPT | Mod: 25,S$GLB,, | Performed by: INTERNAL MEDICINE

## 2022-05-23 PROCEDURE — 3079F PR MOST RECENT DIASTOLIC BLOOD PRESSURE 80-89 MM HG: ICD-10-PCS | Mod: CPTII,S$GLB,, | Performed by: INTERNAL MEDICINE

## 2022-05-23 PROCEDURE — 84466 ASSAY OF TRANSFERRIN: CPT | Performed by: INTERNAL MEDICINE

## 2022-05-23 PROCEDURE — 3044F HG A1C LEVEL LT 7.0%: CPT | Mod: CPTII,S$GLB,, | Performed by: INTERNAL MEDICINE

## 2022-05-23 PROCEDURE — 3044F PR MOST RECENT HEMOGLOBIN A1C LEVEL <7.0%: ICD-10-PCS | Mod: CPTII,S$GLB,, | Performed by: INTERNAL MEDICINE

## 2022-05-23 PROCEDURE — 3008F BODY MASS INDEX DOCD: CPT | Mod: CPTII,S$GLB,, | Performed by: INTERNAL MEDICINE

## 2022-05-23 PROCEDURE — 3008F PR BODY MASS INDEX (BMI) DOCUMENTED: ICD-10-PCS | Mod: CPTII,S$GLB,, | Performed by: INTERNAL MEDICINE

## 2022-05-23 PROCEDURE — 85025 COMPLETE CBC W/AUTO DIFF WBC: CPT | Performed by: INTERNAL MEDICINE

## 2022-05-23 PROCEDURE — 90471 TDAP VACCINE GREATER THAN OR EQUAL TO 7YO IM: ICD-10-PCS | Mod: S$GLB,,, | Performed by: INTERNAL MEDICINE

## 2022-05-23 PROCEDURE — 82728 ASSAY OF FERRITIN: CPT | Performed by: INTERNAL MEDICINE

## 2022-05-23 PROCEDURE — 3075F PR MOST RECENT SYSTOLIC BLOOD PRESS GE 130-139MM HG: ICD-10-PCS | Mod: CPTII,S$GLB,, | Performed by: INTERNAL MEDICINE

## 2022-05-23 RX ORDER — SEMAGLUTIDE 1.34 MG/ML
INJECTION, SOLUTION SUBCUTANEOUS
Qty: 2 PEN | Refills: 5 | Status: SHIPPED | OUTPATIENT
Start: 2022-05-23 | End: 2022-05-25

## 2022-05-23 NOTE — PROGRESS NOTES
Subjective:       Patient ID: Joyce Dumont is a 37 y.o. female.    Medication List with Changes/Refills   New Medications    SEMAGLUTIDE (OZEMPIC) 0.25 MG OR 0.5 MG(2 MG/1.5 ML) PEN INJECTOR    Take 0.25 mg weekly for 2 weeks then increase to 0.5 mg weekly   Current Medications    AZELASTINE (ASTELIN) 137 MCG (0.1 %) NASAL SPRAY    1 spray (137 mcg total) by Nasal route 2 (two) times daily.    ERGOCALCIFEROL (ERGOCALCIFEROL) 50,000 UNIT CAP    TAKE 1 CAPSULE BY MOUTH EVERY 7 DAYS    ISIBLOOM 0.15-0.03 MG PER TABLET        LEVOCETIRIZINE (XYZAL) 5 MG TABLET    Take 1 tablet (5 mg total) by mouth every evening.    LISINOPRIL (PRINIVIL,ZESTRIL) 5 MG TABLET    TAKE 1 TABLET(5 MG) BY MOUTH EVERY DAY    NORETHINDRONE AC-ETH ESTRADIOL (FEMHRT LOW DOSE) 0.5-2.5 MG-MCG PER TABLET    Take 1 tablet by mouth once daily.    PANTOPRAZOLE (PROTONIX) 40 MG TABLET    Take 1 tablet (40 mg total) by mouth once daily.    TIZANIDINE (ZANAFLEX) 4 MG TABLET    TAKE 1 TABLET(4 MG) BY MOUTH EVERY NIGHT AS NEEDED FOR LOWER BACK PAIN   Discontinued Medications    NALTREXONE-BUPROPION (CONTRAVE) 8-90 MG TBSR    1 tab PO qam x 1 wk, then 1 tab PO BID x1 wk, then 2 tabs PO qam and 1 tab PO qpm for 1 week, then 2 tabs BID       Chief Complaint: Annual Exam (Labs were done last week)  She is here today to establish care.     She has hypertension and is taking lisinopril 5 mg daily. She reports home BP readings run in the 120s/70s. She denies any CAD. She denies chest pain or shortness of breath. Lipids on 5/2022 were 176/88/63/95 without treatment.     She has chronic allergic rhinitis and was seen on 5/2022 for uncontrolled symptoms due to chronic use of afrin. She stopped using and was started on xyzal nightly with astelin. Today she reports her symptoms are improved. She still has mild clear runny nose but much less congestion.     She has insulin resistance with HbA1c of 5.7 in the past. Most recent labs on 5/2022 show HbA1c of 5.3.   "    She has anemia on labs from 5/2022 showing H+H 10.8/34. She denies heavy periods. She denies blood in her stool or dark stools.  She does complain of fatigue.     She has GERD and is taking pantoprazole 40 mg daily. She feels this controls her symptoms. She has not had an EGD.     She has vitamin D deficiency and is taking high dose supplementation.     She lives with her 2 children and feels safe at home. 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 She does not exercise but tries to eat healthy.  She continues to struggle with weight loss.     Pap-----12/2019 repeat in 3 years   Tdap---unknown   Influenza vaccine---none   Covid vaccine---2 doses     Review of Systems   Constitutional: Negative for appetite change, fatigue, fever and unexpected weight change.   HENT: Negative for congestion, ear pain, hearing loss, sore throat and trouble swallowing.    Eyes: Negative for pain and visual disturbance.   Respiratory: Negative for cough, chest tightness, shortness of breath and wheezing.    Cardiovascular: Negative for chest pain, palpitations and leg swelling.   Gastrointestinal: Negative for abdominal pain, blood in stool, constipation, diarrhea, nausea and vomiting.   Endocrine: Negative for polyuria.   Genitourinary: Negative for dysuria and hematuria.   Musculoskeletal: Positive for back pain. Negative for arthralgias and myalgias.   Skin: Negative for rash.   Neurological: Negative for dizziness, weakness, numbness and headaches.   Hematological: Does not bruise/bleed easily.   Psychiatric/Behavioral: Positive for sleep disturbance. Negative for dysphoric mood and suicidal ideas. The patient is not nervous/anxious.        Objective:      Vitals:    05/23/22 0739   BP: 136/84   Pulse: 95   Resp: 18   SpO2: 97%   Weight: (!) 140.1 kg (308 lb 12.1 oz)   Height: 5' 7" (1.702 m)     Body mass index is 48.36 kg/m².  Physical Exam    General appearance: No acute distress, cooperative  Eyes: PERRL, EOMI, " conjunctiva clear  Ears: normal external ear and pinna, tm clear without drainage, canals clear  Nose: Normal mucosa without drainage  Throat: no exudates or erythema, tonsils not enlarged  Mouth: no sores or lesions, moist mucous membranes  Neck: FROM, soft, supple, no thyromegaly, no bruits  Lymph: no anterior or posterior cervical adenopathy  Heart::  Regular rate and rhythm, no murmur  Lung: Clear to ascultation bilaterally, no wheezing, no rales, no rhonchi, no distress  Abdomen: Soft, nontender, no distention, no hepatosplenomegaly, bowel sounds normal, no guarding, no rebound, no peritoneal signs  Skin: no rashes, no lesions  Extremities: no edema, no cyanosis  Neuro: CN 2-12 intact, 5/5 muscle strength upper and lower extremity bilaterally, 2+ DTRs UE and LE bilaterally, normal gait  Peripheral pulses: 2+ pedal pulses bilaterally, good perfusion and color  Musculoskeletal: FROM, good strenth, no tenderness  Joint: normal appearance, no swelling, no warmth, no deformity in all joints    Assessment:       1. Well adult exam    2. Primary hypertension    3. Chronic allergic rhinitis    4. Insulin resistance    5. Anemia, unspecified type    6. Gastroesophageal reflux disease without esophagitis    7. Vitamin D deficiency    8. Morbid obesity with BMI of 45.0-49.9, adult        Plan:       Well adult exam  She is due for Tdap today and will need a pap this year.   -     Tdap Vaccine    Primary hypertension  Well controlled and continue current regimen.     Chronic allergic rhinitis  Improved on current regimen.    Insulin resistance  Will start ozempic at 0.25 mg weekly for 2 weeks then increase to 0.5 mg weekly. She will let me know if not tolerating or if insurance denies.   -     semaglutide (OZEMPIC) 0.25 mg or 0.5 mg(2 mg/1.5 mL) pen injector; Take 0.25 mg weekly for 2 weeks then increase to 0.5 mg weekly  Dispense: 2 pen; Refill: 5    Anemia, unspecified type  Question etiology. She has no history of heavy  periods and is on BCP. Will check iron levels today. Will get EGD and colonoscopy.    -     CBC Auto Differential  -     Iron and TIBC  -     Ferritin  -     Case Request Endoscopy: COLONOSCOPY  -     Case Request Endoscopy: ESOPHAGOGASTRODUODENOSCOPY (EGD)    Gastroesophageal reflux disease without esophagitis  Well controlled and continue current regimen.     Vitamin D deficiency  Continue high dose supplementation.     Morbid obesity with BMI of 45.0-49.9, adult  Long discussion on the benefits of healthy eating and regular exercise to help lose weight and help control hypertension and prevent diabetes  -     semaglutide (OZEMPIC) 0.25 mg or 0.5 mg(2 mg/1.5 mL) pen injector; Take 0.25 mg weekly for 2 weeks then increase to 0.5 mg weekly  Dispense: 2 pen; Refill: 5    Follow up in about 1 year (around 5/23/2023) for annual exam.

## 2022-05-24 ENCOUNTER — PATIENT MESSAGE (OUTPATIENT)
Dept: FAMILY MEDICINE | Facility: CLINIC | Age: 37
End: 2022-05-24
Payer: COMMERCIAL

## 2022-05-24 NOTE — TELEPHONE ENCOUNTER
Ozempic PA denied --- pt does have Diabetes Type 2 and pt has not tried and failed  Metformin .    Pls advise .--lp

## 2022-05-24 NOTE — TELEPHONE ENCOUNTER
Ozempic needs PA  ID #[id number]  [phone]  PA submitted over the phone. Waiting for response   Case # [id number]

## 2022-05-25 ENCOUNTER — PATIENT MESSAGE (OUTPATIENT)
Dept: FAMILY MEDICINE | Facility: CLINIC | Age: 37
End: 2022-05-25
Payer: COMMERCIAL

## 2022-05-25 RX ORDER — METFORMIN HYDROCHLORIDE 500 MG/1
500 TABLET, EXTENDED RELEASE ORAL
Qty: 90 TABLET | Refills: 3 | Status: SHIPPED | OUTPATIENT
Start: 2022-05-25 | End: 2023-05-25

## 2022-05-25 NOTE — TELEPHONE ENCOUNTER
Please let her know that insurance did not cover ozempic.  I want to start metformin to take once a day.     Let me know if she agrees or wants to wait on treatment.     Thanks

## 2022-05-31 ENCOUNTER — TELEPHONE (OUTPATIENT)
Dept: FAMILY MEDICINE | Facility: CLINIC | Age: 37
End: 2022-05-31
Payer: COMMERCIAL

## 2022-05-31 RX ORDER — FERROUS SULFATE 325(65) MG
325 TABLET ORAL
Refills: 0
Start: 2022-05-31

## 2022-05-31 NOTE — TELEPHONE ENCOUNTER
Please let her know that she continues to be anemic with low iron levels. It is stable but still low. She needs to take iron daily. She can buy OTC.     She needs to schedule her colonoscopy and EGD to find out where she is losing blood.     Thanks

## 2022-05-31 NOTE — TELEPHONE ENCOUNTER
Spoke w/ pt. Advised as recommended. She states she currently has scope and EGD sched at [facility] for 8/1/22. States she can only do it on a Mon and this is the first Mon they have. If this cannot wait until 8/1/22, please advise.

## 2022-06-06 ENCOUNTER — PATIENT MESSAGE (OUTPATIENT)
Dept: FAMILY MEDICINE | Facility: CLINIC | Age: 37
End: 2022-06-06
Payer: COMMERCIAL

## 2022-06-06 NOTE — TELEPHONE ENCOUNTER
Rx for Ozempic has been cancelled at [facility] . Rx has been changed to Metformin. Spoke w/ [place] at [facility] .-marissa

## 2022-06-06 NOTE — TELEPHONE ENCOUNTER
She was started on metformin because ozempic denied.     So far she is tolerating (she has not said otherwise)    Thanks

## 2022-06-06 NOTE — TELEPHONE ENCOUNTER
Medicine only covered with dx of Type 2 DM - per email received from [employer] following submission of Ozempic PA

## 2022-07-21 ENCOUNTER — TELEPHONE (OUTPATIENT)
Dept: GASTROENTEROLOGY | Facility: CLINIC | Age: 37
End: 2022-07-21
Payer: COMMERCIAL

## 2022-07-21 NOTE — TELEPHONE ENCOUNTER
----- Message from Arely Oliveira sent at 7/21/2022  4:58 PM CDT -----  Contact: self  Type: Needs Medical Advice  Who Called: Patient  Best Call Back Number: [phone] (home)   Additional Information: Pt just wants to confirm time for procedure and wants to make sure she gets confirmed. Plz call pt back . Thanks

## 2022-07-21 NOTE — TELEPHONE ENCOUNTER
Spoke with pt. Verified pt still has prep instructions for upcoming procedure with Dr. Nelson. Pt stated they do. Pt informed surgery center will call day or two prior with arrival time. Pt verbalized understanding to all.

## 2022-07-22 DIAGNOSIS — K29.00 OTHER ACUTE GASTRITIS WITHOUT HEMORRHAGE: ICD-10-CM

## 2022-07-22 RX ORDER — PANTOPRAZOLE SODIUM 40 MG/1
40 TABLET, DELAYED RELEASE ORAL DAILY
Qty: 30 TABLET | Refills: 1 | Status: SHIPPED | OUTPATIENT
Start: 2022-07-22 | End: 2022-09-28

## 2022-07-22 NOTE — TELEPHONE ENCOUNTER
No new care gaps identified.  [facility] Embedded Care Gaps. Reference number: [id number]. 7/22/2022   1:12:40 AM CDT

## 2022-07-30 ENCOUNTER — NURSE TRIAGE (OUTPATIENT)
Dept: ADMINISTRATIVE | Facility: CLINIC | Age: 37
End: 2022-07-30
Payer: COMMERCIAL

## 2022-07-30 NOTE — TELEPHONE ENCOUNTER
Pt scheduled for colonoscopy Monday. Unable to locate magnesium citrate due to a recall. Call placed to MD on call. Instructions given for Miralax- gatorade prep per Dr. Tom via secure chat. Pt verbalized understanding. Encounter routed to provider.     Reason for Disposition   [1] Caller has URGENT medicine question about med that PCP or specialist prescribed AND [2] triager unable to answer question    Protocols used: MEDICATION QUESTION CALL-A-

## 2022-08-08 ENCOUNTER — PATIENT MESSAGE (OUTPATIENT)
Dept: FAMILY MEDICINE | Facility: CLINIC | Age: 37
End: 2022-08-08
Payer: COMMERCIAL

## 2022-10-10 ENCOUNTER — OFFICE VISIT (OUTPATIENT)
Dept: FAMILY MEDICINE | Facility: CLINIC | Age: 37
End: 2022-10-10
Payer: COMMERCIAL

## 2022-10-10 DIAGNOSIS — J01.00 ACUTE NON-RECURRENT MAXILLARY SINUSITIS: Primary | ICD-10-CM

## 2022-10-10 PROCEDURE — 99213 OFFICE O/P EST LOW 20 MIN: CPT | Mod: 95,,, | Performed by: INTERNAL MEDICINE

## 2022-10-10 PROCEDURE — 1160F RVW MEDS BY RX/DR IN RCRD: CPT | Mod: CPTII,95,, | Performed by: INTERNAL MEDICINE

## 2022-10-10 PROCEDURE — 3044F HG A1C LEVEL LT 7.0%: CPT | Mod: CPTII,95,, | Performed by: INTERNAL MEDICINE

## 2022-10-10 PROCEDURE — 1159F PR MEDICATION LIST DOCUMENTED IN MEDICAL RECORD: ICD-10-PCS | Mod: CPTII,95,, | Performed by: INTERNAL MEDICINE

## 2022-10-10 PROCEDURE — 4010F ACE/ARB THERAPY RXD/TAKEN: CPT | Mod: CPTII,95,, | Performed by: INTERNAL MEDICINE

## 2022-10-10 PROCEDURE — 1159F MED LIST DOCD IN RCRD: CPT | Mod: CPTII,95,, | Performed by: INTERNAL MEDICINE

## 2022-10-10 PROCEDURE — 99213 PR OFFICE/OUTPT VISIT, EST, LEVL III, 20-29 MIN: ICD-10-PCS | Mod: 95,,, | Performed by: INTERNAL MEDICINE

## 2022-10-10 PROCEDURE — 4010F PR ACE/ARB THEARPY RXD/TAKEN: ICD-10-PCS | Mod: CPTII,95,, | Performed by: INTERNAL MEDICINE

## 2022-10-10 PROCEDURE — 1160F PR REVIEW ALL MEDS BY PRESCRIBER/CLIN PHARMACIST DOCUMENTED: ICD-10-PCS | Mod: CPTII,95,, | Performed by: INTERNAL MEDICINE

## 2022-10-10 PROCEDURE — 3044F PR MOST RECENT HEMOGLOBIN A1C LEVEL <7.0%: ICD-10-PCS | Mod: CPTII,95,, | Performed by: INTERNAL MEDICINE

## 2022-10-10 RX ORDER — AMOXICILLIN AND CLAVULANATE POTASSIUM 875; 125 MG/1; MG/1
1 TABLET, FILM COATED ORAL EVERY 12 HOURS
Qty: 20 TABLET | Refills: 0 | Status: SHIPPED | OUTPATIENT
Start: 2022-10-10

## 2022-10-10 NOTE — PROGRESS NOTES
Subjective:       Patient ID: Joyce Dumont is a 37 y.o. female.    Medication List with Changes/Refills   New Medications    AMOXICILLIN-CLAVULANATE 875-125MG (AUGMENTIN) 875-125 MG PER TABLET    Take 1 tablet by mouth every 12 (twelve) hours.   Current Medications    AZELASTINE (ASTELIN) 137 MCG (0.1 %) NASAL SPRAY    1 spray (137 mcg total) by Nasal route 2 (two) times daily.    DEXTROAMPHETAMINE-AMPHETAMINE (ADDERALL XR) 20 MG 24 HR CAPSULE    Take 20 mg by mouth every morning.    ERGOCALCIFEROL (ERGOCALCIFEROL) 50,000 UNIT CAP    TAKE 1 CAPSULE BY MOUTH EVERY 7 DAYS    FERROUS SULFATE (IRON) 325 MG (65 MG IRON) TAB TABLET    Take 1 tablet (325 mg total) by mouth daily with breakfast.    ISIBLOOM 0.15-0.03 MG PER TABLET        LEVOCETIRIZINE (XYZAL) 5 MG TABLET    Take 1 tablet (5 mg total) by mouth every evening.    LISINOPRIL (PRINIVIL,ZESTRIL) 5 MG TABLET    TAKE 1 TABLET(5 MG) BY MOUTH EVERY DAY    METFORMIN (GLUCOPHAGE-XR) 500 MG ER 24HR TABLET    Take 1 tablet (500 mg total) by mouth daily with breakfast.    NORETHINDRONE AC-ETH ESTRADIOL (FEMHRT LOW DOSE) 0.5-2.5 MG-MCG PER TABLET    Take 1 tablet by mouth once daily.    PANTOPRAZOLE (PROTONIX) 40 MG TABLET    TAKE 1 TABLET(40 MG) BY MOUTH EVERY DAY    TIZANIDINE (ZANAFLEX) 4 MG TABLET    TAKE 1 TABLET(4 MG) BY MOUTH EVERY 6 HOURS AS NEEDED       Chief Complaint: No chief complaint on file.  The patient location is: home   The chief complaint leading to consultation is: sinus infection     Visit type: audiovisual    Face to Face time with patient: 10 minutes   10 minutes of total time spent on the encounter, which includes face to face time and non-face to face time preparing to see the patient (eg, review of tests), Obtaining and/or reviewing separately obtained history, Documenting clinical information in the electronic or other health record, Independently interpreting results (not separately reported) and communicating results to the  patient/family/caregiver, or Care coordination (not separately reported).     Each patient to whom he or she provides medical services by telemedicine is:  (1) informed of the relationship between the physician and patient and the respective role of any other health care provider with respect to management of the patient; and (2) notified that he or she may decline to receive medical services by telemedicine and may withdraw from such care at any time.    Notes:   She presents with nasal congested with headaches and sinus pressure for 2-3 weeks. Covid at home is negative. She is trying nasal spray without success.  Nasal congestion that was clear to now brownish color. No coughing. She is sneezing. No ear pain or sore throat. She has ear fullness. No fevers.  She has sinus pressure and headaches. No rashes. No vomiting or diarrhea.      Review of Systems   Constitutional:  Negative for activity change, appetite change, chills, fatigue, fever and unexpected weight change.   HENT:  Positive for congestion, rhinorrhea, sinus pressure and sinus pain. Negative for ear discharge, ear pain, hearing loss, mouth sores, postnasal drip, sore throat and trouble swallowing.    Eyes:  Negative for pain, discharge, redness and visual disturbance.   Respiratory:  Negative for cough, chest tightness, shortness of breath and wheezing.    Cardiovascular:  Negative for chest pain and palpitations.   Gastrointestinal:  Negative for abdominal pain, blood in stool, constipation, diarrhea, nausea and vomiting.   Endocrine: Negative for polydipsia and polyuria.   Genitourinary:  Negative for difficulty urinating, dysuria, hematuria and menstrual problem.   Musculoskeletal:  Negative for arthralgias, joint swelling, neck pain and neck stiffness.   Skin:  Negative for rash.   Neurological:  Positive for headaches. Negative for weakness.   Hematological:  Negative for adenopathy.   Psychiatric/Behavioral:  Negative for confusion and dysphoric  mood.      Objective:      There were no vitals filed for this visit.  There is no height or weight on file to calculate BMI.  Physical Exam    Alert, no distress  No respiratory distress    Assessment:       1. Acute non-recurrent maxillary sinusitis        Plan:       Acute non-recurrent maxillary sinusitis  Prolonged symptoms that are not responding to OTC treatments and now with change of color. Will treat with abx. ADvised to use nasal saline to help with congestion. If  no improvement then will need to be seen in clinic.   -     amoxicillin-clavulanate 875-125mg (AUGMENTIN) 875-125 mg per tablet; Take 1 tablet by mouth every 12 (twelve) hours.  Dispense: 20 tablet; Refill: 0    Follow up if symptoms worsen or fail to improve.

## 2023-03-21 ENCOUNTER — PATIENT OUTREACH (OUTPATIENT)
Dept: ADMINISTRATIVE | Facility: HOSPITAL | Age: 38
End: 2023-03-21
Payer: COMMERCIAL

## 2023-03-21 DIAGNOSIS — Z12.31 OTHER SCREENING MAMMOGRAM: ICD-10-CM

## 2023-05-09 DIAGNOSIS — J30.9 CHRONIC ALLERGIC RHINITIS: ICD-10-CM

## 2023-05-09 RX ORDER — AZELASTINE 1 MG/ML
SPRAY, METERED NASAL
Qty: 90 ML | Refills: 1 | Status: SHIPPED | OUTPATIENT
Start: 2023-05-09

## 2023-05-09 NOTE — TELEPHONE ENCOUNTER
Provider Staff:  Action required for this patient     Please see care gap opportunities below in Care Due Message.    Thanks!  [facility]     Appointments      Date Provider   Last Visit   10/10/2022 Nancy Casarez DO   Next Visit   Visit date not found Nancy Casarez DO     Refill Decision Note   Joyce Dumont  is requesting a refill authorization.  Brief Assessment and Rationale for Refill:  Approve     Medication Therapy Plan:         Comments:     Note composed:5:27 AM 05/09/2023             Appointments     Last Visit   10/10/2022 Nancy Casarez DO   Next Visit   Visit date not found Nancy Casarez DO

## 2023-05-09 NOTE — TELEPHONE ENCOUNTER
Care Due:                  Date            Visit Type   Department     Provider  --------------------------------------------------------------------------------                                [facility]                              SAME DAY                              VIRTUAL      [facility]  Last Visit: 10-      VISIT        MEDICINE       Nancy Casarez  Next Visit: None Scheduled  None         None Found                                                            Last  Test          Frequency    Reason                     Performed    Due Date  --------------------------------------------------------------------------------    CMP.........  12 months..  lisinopriL, metFORMIN....  05- 05-    HBA1C.......  6 months...  metFORMIN................  05- 11-    [employer] Embedded Care Due Messages. Reference number: [id number].   5/09/2023 3:19:03 AM CDT

## 2023-05-24 DIAGNOSIS — I10 ESSENTIAL HYPERTENSION: ICD-10-CM

## 2023-05-25 ENCOUNTER — PATIENT MESSAGE (OUTPATIENT)
Dept: ADMINISTRATIVE | Facility: HOSPITAL | Age: 38
End: 2023-05-25
Payer: COMMERCIAL

## 2023-05-25 ENCOUNTER — PATIENT OUTREACH (OUTPATIENT)
Dept: ADMINISTRATIVE | Facility: HOSPITAL | Age: 38
End: 2023-05-25
Payer: COMMERCIAL

## 2023-05-25 NOTE — PROGRESS NOTES
Uncontrolled BP REPORT  BP Readings from Last 3 Encounters:   08/01/22 122/72   05/23/22 136/84   09/22/21 120/77       Non-compliant report chart audits for HYPERTENSION MANAGEMENT     Outreach to patient in reference to hypertension management       BLOOD PRESSURE FOLLOW UP NEEDED WITH A CARE TEAM MEMBER    ACTIVE PORTAL MESSAGE OR LETTER SENT

## 2023-06-08 DIAGNOSIS — I10 ESSENTIAL HYPERTENSION: ICD-10-CM

## 2023-06-08 NOTE — TELEPHONE ENCOUNTER
Refill Routing Note   Medication(s) are not appropriate for processing by [facility] for the following reason(s):      Required labs outdated    [facility] action(s):  Defer Care Due:  None identified          Appointments  past 12m or future 3m with PCP    Date Provider   Last Visit   10/10/2022 Nancy Casarez,    Next Visit   Visit date not found Nancy Casarez DO   ED visits in past 90 days: 0        Note composed:12:48 PM 06/08/2023

## 2023-06-08 NOTE — TELEPHONE ENCOUNTER
No care due was identified.  [facility] Embedded Care Due Messages. Reference number: [id number].   6/08/2023 7:52:00 AM CDT

## 2023-06-10 RX ORDER — LISINOPRIL 5 MG/1
TABLET ORAL
Qty: 90 TABLET | Refills: 2 | Status: SHIPPED | OUTPATIENT
Start: 2023-06-10

## 2023-07-28 DIAGNOSIS — M54.50 ACUTE MIDLINE LOW BACK PAIN WITHOUT SCIATICA: ICD-10-CM

## 2023-07-28 NOTE — TELEPHONE ENCOUNTER
Care Due:                  Date            Visit Type   Department     Provider  --------------------------------------------------------------------------------                                [facility]                              SAME DAY                              VIRTUAL      [facility]  Last Visit: 10-      VISIT        MEDICINE       Nancy Casarez  Next Visit: None Scheduled  None         None Found                                                            Last  Test          Frequency    Reason                     Performed    Due Date  --------------------------------------------------------------------------------    Office Visit  12 months..  levocetirizine,            10-   10-                             lisinopriL, metFORMIN....    CMP.........  12 months..  lisinopriL, metFORMIN....  05- 05-    HBA1C.......  6 months...  metFORMIN................  05- 11-    [facility] Embedded Care Due Messages. Reference number: [id number].   7/28/2023 3:17:14 AM CDT

## 2023-07-29 RX ORDER — TIZANIDINE 4 MG/1
4 TABLET ORAL EVERY 6 HOURS PRN
Qty: 40 TABLET | Refills: 1 | Status: SHIPPED | OUTPATIENT
Start: 2023-07-29 | End: 2023-08-19

## 2023-08-18 DIAGNOSIS — M54.50 ACUTE MIDLINE LOW BACK PAIN WITHOUT SCIATICA: ICD-10-CM

## 2023-08-19 RX ORDER — TIZANIDINE 4 MG/1
4 TABLET ORAL EVERY 6 HOURS PRN
Qty: 40 TABLET | Refills: 0 | Status: SHIPPED | OUTPATIENT
Start: 2023-08-19

## 2023-08-19 NOTE — TELEPHONE ENCOUNTER
No care due was identified.  [facility] Embedded Care Due Messages. Reference number: [id number].   8/18/2023 3:18:30 AM CDT  
Will refill for one month but needs to be seen for further refills  
severe in the context of chronic illness

## 2023-08-26 DIAGNOSIS — M54.50 ACUTE MIDLINE LOW BACK PAIN WITHOUT SCIATICA: ICD-10-CM

## 2023-08-26 NOTE — TELEPHONE ENCOUNTER
No care due was identified.  [facility] Embedded Care Due Messages. Reference number: [id number].   8/26/2023 3:17:33 AM CDT

## 2023-08-28 RX ORDER — TIZANIDINE 4 MG/1
4 TABLET ORAL EVERY 6 HOURS PRN
Qty: 40 TABLET | Refills: 0 | OUTPATIENT
Start: 2023-08-28

## 2023-10-20 ENCOUNTER — PATIENT MESSAGE (OUTPATIENT)
Dept: ADMINISTRATIVE | Facility: HOSPITAL | Age: 38
End: 2023-10-20
Payer: COMMERCIAL

## 2023-10-20 ENCOUNTER — PATIENT OUTREACH (OUTPATIENT)
Dept: ADMINISTRATIVE | Facility: HOSPITAL | Age: 38
End: 2023-10-20
Payer: COMMERCIAL

## 2023-10-31 ENCOUNTER — TELEPHONE (OUTPATIENT)
Dept: FAMILY MEDICINE | Facility: CLINIC | Age: 38
End: 2023-10-31
Payer: COMMERCIAL

## 2023-10-31 DIAGNOSIS — R92.8 ABNORMAL MAMMOGRAM: Primary | ICD-10-CM

## 2023-10-31 NOTE — TELEPHONE ENCOUNTER
----- Message from Beth Kapoor sent at 10/31/2023  2:12 PM CDT -----  Regarding: Test results  Type:  Test Results    Who Called: Pt    Name of Test (Lab/Mammo/Etc): Mammo    Date of Test: 10/30/    Ordering Provider: Hermelindo    Where the test was performed: [facility]    Would the patient rather a call back or a response via [facility]? Call back    Best Call Back Number: [phone]      Additional Information:  Sts she was confused about what they were saying about the right breast and wants clarification.    Please advise.  Thank you.

## 2023-11-01 NOTE — TELEPHONE ENCOUNTER
Imaging on the left was normal.     Imaging on the right showed an asymmetric area and she needs to have more imaging to better see this area.  When they do a diagnostic mammogram they take pictures from more angles so you get a more precise picture of the breast. They will also u/s the area to make sure there is no concern.  This will give radiology a more accurate look at the breast.     Please schedule diagnostic mammogram and u/s on the right.     Thanks

## 2024-02-17 ENCOUNTER — ON-DEMAND VIRTUAL (OUTPATIENT)
Dept: URGENT CARE | Facility: CLINIC | Age: 39
End: 2024-02-17
Payer: COMMERCIAL

## 2024-02-17 DIAGNOSIS — B97.89 VIRAL RESPIRATORY ILLNESS: Primary | ICD-10-CM

## 2024-02-17 DIAGNOSIS — J98.8 VIRAL RESPIRATORY ILLNESS: Primary | ICD-10-CM

## 2024-02-17 PROCEDURE — 99203 OFFICE O/P NEW LOW 30 MIN: CPT | Mod: 95,,, | Performed by: NURSE PRACTITIONER

## 2024-02-17 RX ORDER — PREDNISONE 20 MG/1
20 TABLET ORAL DAILY
Qty: 5 TABLET | Refills: 0 | Status: SHIPPED | OUTPATIENT
Start: 2024-02-17 | End: 2024-02-22

## 2024-02-17 RX ORDER — AZELASTINE 1 MG/ML
1 SPRAY, METERED NASAL 2 TIMES DAILY
Qty: 30 ML | Refills: 0 | Status: SHIPPED | OUTPATIENT
Start: 2024-02-17

## 2024-02-17 RX ORDER — BENZONATATE 100 MG/1
100 CAPSULE ORAL 3 TIMES DAILY PRN
Qty: 30 CAPSULE | Refills: 0 | Status: SHIPPED | OUTPATIENT
Start: 2024-02-17 | End: 2024-02-27

## 2024-02-18 NOTE — PROGRESS NOTES
Subjective:      Patient ID: Joyce Dumont is a 38 y.o. female.    Vitals:  vitals were not taken for this visit.     Chief Complaint: URI      Visit Type: TELE AUDIOVISUAL    Present with the patient at the time of consultation: TELEMED PRESENT WITH PATIENT: None    Past Medical History:   Diagnosis Date    Essential (primary) hypertension     GERD (gastroesophageal reflux disease)     Insulin resistance      Past Surgical History:   Procedure Laterality Date    ANTERIOR CRUCIATE LIGAMENT REPAIR       SECTION      COLONOSCOPY N/A 2022    Procedure: COLONOSCOPY;  Surgeon: Shayne Nelson MD;  Location: [facility];  Service: Endoscopy;  Laterality: N/A;    ESOPHAGOGASTRODUODENOSCOPY N/A 2022    Procedure: ESOPHAGOGASTRODUODENOSCOPY (EGD);  Surgeon: Shayne Nelson MD;  Location: [facility];  Service: Endoscopy;  Laterality: N/A;     Review of patient's allergies indicates:  No Known Allergies  Current Outpatient Medications on File Prior to Visit   Medication Sig Dispense Refill    amoxicillin-clavulanate 875-125mg (AUGMENTIN) 875-125 mg per tablet Take 1 tablet by mouth every 12 (twelve) hours. 20 tablet 0    azelastine (ASTELIN) 137 mcg (0.1 %) nasal spray SPRAY 1 SPRAY BY NASAL ROUTE 2 TIMES DAILY 90 mL 1    dextroamphetamine-amphetamine (ADDERALL XR) 20 MG 24 hr capsule Take 20 mg by mouth every morning.      ergocalciferol (ERGOCALCIFEROL) 50,000 unit Cap TAKE 1 CAPSULE BY MOUTH EVERY 7 DAYS (Patient not taking: Reported on 2022) 12 capsule 1    ferrous sulfate (IRON) 325 mg (65 mg iron) Tab tablet Take 1 tablet (325 mg total) by mouth daily with breakfast.  0    ISIBLOOM 0.15-0.03 mg per tablet       levocetirizine (XYZAL) 5 MG tablet TAKE 1 TABLET(5 MG) BY MOUTH EVERY EVENING 90 tablet 1    lisinopriL (PRINIVIL,ZESTRIL) 5 MG tablet TAKE 1 TABLET(5 MG) BY MOUTH EVERY DAY 90 tablet 2    metFORMIN (GLUCOPHAGE-XR) 500 MG ER 24hr tablet Take 1 tablet (500 mg total) by mouth daily  with breakfast. (Patient not taking: Reported on 2022) 90 tablet 3    norethindrone ac-eth estradiol (FEMHRT LOW DOSE) 0.5-2.5 mg-mcg per tablet Take 1 tablet by mouth once daily.      pantoprazole (PROTONIX) 40 MG tablet TAKE 1 TABLET(40 MG) BY MOUTH EVERY DAY 30 tablet 5    tiZANidine (ZANAFLEX) 4 MG tablet TAKE 1 TABLET(4 MG) BY MOUTH EVERY 6 HOURS AS NEEDED 40 tablet 0     No current facility-administered medications on file prior to visit.     Family History   Problem Relation Age of Onset    No Known Problems Mother     Asthma Father     Diabetes Maternal Grandmother        Medications Ordered                [id number] [place] [address] AT [place] [address] & [address] [address]    Telephone: [phone]   Fax: [phone]   Hours: Not open 24 hours                         E-Prescribed (3 of 3)              azelastine (ASTELIN) 137 mcg (0.1 %) nasal spray    Si spray (137 mcg total) by Nasal route 2 (two) times daily.       Start: 24     Quantity: 30 mL Refills: 0                         benzonatate (TESSALON) 100 MG capsule    Sig: Take 1 capsule (100 mg total) by mouth 3 (three) times daily as needed for Cough.       Start: 24     Quantity: 30 capsule Refills: 0                         predniSONE (DELTASONE) 20 MG tablet    Sig: Take 1 tablet (20 mg total) by mouth once daily. for 5 days       Start: 24     Quantity: 5 tablet Refills: 0                           Ohs Peq Odvv Intake    2024  5:48 PM CST - Filed by Patient   What is your current physical address in the event of a medical emergency? [address]   Are you able to take your vital signs? No   Please attach any relevant images or files          39yo female with c/o congestion, fever, and cough. She has done at home covid test and it was negative. She denies sob and wheezing. She states sore throat resolved.     URI   Associated symptoms include  congestion, coughing and a sore throat. Pertinent negatives include no headaches, nausea, vomiting or wheezing.       Constitution: Positive for fever. Negative for chills and fatigue.   HENT:  Positive for congestion, postnasal drip and sore throat.    Cardiovascular: Negative.    Respiratory:  Positive for cough and sputum production. Negative for shortness of breath and wheezing.    Gastrointestinal: Negative.  Negative for nausea and vomiting.   Endocrine: negative.   Genitourinary: Negative.  Negative for frequency and urgency.   Musculoskeletal: Negative.    Skin: Negative.    Allergic/Immunologic: Negative.    Neurological: Negative.  Negative for headaches.   Hematologic/Lymphatic: Negative.    Psychiatric/Behavioral: Negative.          Objective:   The physical exam was conducted virtually.  Physical Exam   Constitutional: She is oriented to person, place, and time. She appears well-developed.   HENT:   Head: Normocephalic and atraumatic.   Ears:   Right Ear: Hearing, tympanic membrane and external ear normal.   Left Ear: Hearing, tympanic membrane and external ear normal.   Nose: Rhinorrhea and congestion present.   Mouth/Throat: Uvula is midline, oropharynx is clear and moist and mucous membranes are normal.   Eyes: Conjunctivae and EOM are normal. Pupils are equal, round, and reactive to light.   Neck: Neck supple.   Cardiovascular: Normal rate.   Pulmonary/Chest: Effort normal and breath sounds normal.   Musculoskeletal: Normal range of motion.         General: Normal range of motion.   Neurological: She is alert and oriented to person, place, and time.   Skin: Skin is warm.   Psychiatric: Her behavior is normal. Thought content normal.   Nursing note and vitals reviewed.      Assessment:     1. Viral respiratory illness        Plan:   PLEASE READ YOUR DISCHARGE INSTRUCTIONS ENTIRELY AS IT CONTAINS IMPORTANT INFORMATION.      Please drink plenty of fluids.    Please get plenty of rest.    Please return  here or go to the Emergency Department for any concerns or worsening of condition.    Please take an over the counter antihistamine medication (allegra/Claritin/Zyrtec) of your choice as directed.    Try an over the counter decongestant like Mucinex D or Sudafed. You buy this behind the pharmacy counter    If you do have Hypertension or palpitations, it is safe to take Coricidin HBP for relief of sinus symptoms.    If not allergic, please take over the counter Tylenol (Acetaminophen) and/or Motrin (Ibuprofen) as directed for control of pain and/or fever.  Please follow up with your primary care doctor or specialist as needed.    Sore throat recommendations: Warm fluids, warm salt water gargles, throat lozenges, tea, honey, soup, rest, hydration.    Use over the counter flonase: one spray each nostril twice daily OR two sprays each nostril once daily.     Sinus rinses DO NOT USE TAP WATER, if you must, water must be a rolling boil for 1 minute, let it cool, then use.  May use distilled water, or over the counter nasal saline rinses.  Vics vapor rub in shower to help open nasal passages.  May use nasal gel to keep passages moisturized.  May use Nasal saline sprays during the day for added relief of congestion.   For those who go to the gym, please do not use the sauna or steam room now to clear sinuses.    If you  smoke, please stop smoking.      Please return or see your primary care doctor if you develop new or worsening symptoms.     Please arrange follow up with your primary medical clinic as soon as possible. You must understand that you've received an Urgent Care treatment only and that you may be released before all of your medical problems are known or treated. You, the patient, will arrange for follow up as instructed. If your symptoms worsen or fail to improve you should go to the Emergency Room.      Viral respiratory illness  -     benzonatate (TESSALON) 100 MG capsule; Take 1 capsule (100 mg total) by  mouth 3 (three) times daily as needed for Cough.  Dispense: 30 capsule; Refill: 0  -     predniSONE (DELTASONE) 20 MG tablet; Take 1 tablet (20 mg total) by mouth once daily. for 5 days  Dispense: 5 tablet; Refill: 0  -     azelastine (ASTELIN) 137 mcg (0.1 %) nasal spray; 1 spray (137 mcg total) by Nasal route 2 (two) times daily.  Dispense: 30 mL; Refill: 0

## 2024-03-28 DIAGNOSIS — R73.03 PREDIABETES: ICD-10-CM

## 2024-04-04 ENCOUNTER — PATIENT MESSAGE (OUTPATIENT)
Dept: ADMINISTRATIVE | Facility: HOSPITAL | Age: 39
End: 2024-04-04
Payer: COMMERCIAL

## 2024-05-09 LAB
HPV APTIMA: NEGATIVE
PAP RECOMMENDATION EXT: NORMAL
PAP SMEAR: NORMAL

## 2024-07-09 ENCOUNTER — PATIENT MESSAGE (OUTPATIENT)
Dept: ADMINISTRATIVE | Facility: HOSPITAL | Age: 39
End: 2024-07-09
Payer: COMMERCIAL

## 2025-02-21 ENCOUNTER — PATIENT OUTREACH (OUTPATIENT)
Dept: ADMINISTRATIVE | Facility: HOSPITAL | Age: 40
End: 2025-02-21
Payer: COMMERCIAL

## 2025-03-20 ENCOUNTER — OFFICE VISIT (OUTPATIENT)
Dept: FAMILY MEDICINE | Facility: CLINIC | Age: 40
End: 2025-03-20
Payer: COMMERCIAL

## 2025-03-20 DIAGNOSIS — J30.1 SEASONAL ALLERGIC RHINITIS DUE TO POLLEN: Primary | ICD-10-CM

## 2025-03-20 PROCEDURE — 98005 SYNCH AUDIO-VIDEO EST LOW 20: CPT | Mod: 95,,, | Performed by: PHYSICIAN ASSISTANT

## 2025-03-20 RX ORDER — BENZONATATE 100 MG/1
100 CAPSULE ORAL 3 TIMES DAILY PRN
Qty: 30 CAPSULE | Refills: 0 | Status: SHIPPED | OUTPATIENT
Start: 2025-03-20 | End: 2025-03-30

## 2025-03-20 RX ORDER — FLUTICASONE PROPIONATE 50 MCG
SPRAY, SUSPENSION (ML) NASAL
Qty: 18.2 ML | Refills: 3 | Status: SHIPPED | OUTPATIENT
Start: 2025-03-20

## 2025-03-20 NOTE — PATIENT INSTRUCTIONS
A few reminders from today:    Start daily allegra (fexofenadine 180 mg) daily  Start flonase as prescribed  Tessalon as needed for cough  Cough drops as needed  Honey lemon tea to help soothe the throat  Hot steamy showers/warm compresses over sinuses  You can also try a humidifier to help with cough/congestion  Rest, hydrate, and make sure to eat healthy  Tylenol as needed for disocmofrt  Take daily multivitamin  Warm salt water gargles as needed for throat irritation      Follow up with me if needed.   Please go to ER/urgent care if symptoms persist/worsen, especially if after hours.     Do not hesitate to get in touch with me should you have any further questions.     Thank you for trusting me with your care!  I wish you health and happiness.    -Radha Buckley PA-C

## 2025-03-20 NOTE — PROGRESS NOTES
Subjective       Patient ID: Joyce Dumont is a 40 y.o. female.    Patient location- [place], [place]  My location- [place], [place]  Technology used- Audiovisual  Total time for encounter- 15 minutes      Chief Complaint: cough and congestion      Pt is new to me, PCP Dr. Casarez.     Pt is a 40 year old female with HTN, ADHD, vit D def, prediabetes. She presents today complaining of congestion and cough that has been present since February, but now becoming more persistent. Feels maxillary sinus pressure. Rhinorrhea is typically clear. She does report sneezing, itchy eyes. She does struggle with allergies. Takes as needed antihistamine, no consistent nasal sprays. No fever, chills, body aches. Has a foul smell in her nose. Also reports a cough, which is mostly dry. Occurs randomly throughout the day. Has noticed postnasal drip when she gets up in the morning. No asthma history.     Review of Systems   Constitutional:  Negative for chills and fever.   HENT:  Positive for postnasal drip and rhinorrhea. Negative for ear pain and sore throat.    Respiratory:  Positive for cough. Negative for shortness of breath and wheezing.    Cardiovascular:  Negative for chest pain.   Musculoskeletal:  Negative for myalgias.   Integumentary:  Negative for rash.   Allergic/Immunologic: Positive for environmental allergies.   Neurological:  Negative for headaches.   All other systems reviewed and are negative.         Objective     Physical Exam  Constitutional:       General: She is not in acute distress.     Appearance: Normal appearance. She is not ill-appearing, toxic-appearing or diaphoretic.   HENT:      Head: Normocephalic and atraumatic.   Pulmonary:      Effort: No respiratory distress.   Neurological:      General: No focal deficit present.      Mental Status: She is alert and oriented to person, place, and time.   Psychiatric:         Mood and Affect: Mood normal.         Behavior: Behavior normal.         Thought Content:  Thought content normal.         Judgment: Judgment normal.         This exam is limited, as this is a virtual visit.     1. Seasonal allergic rhinitis due to pollen (Primary)  -start daily allegra  - fluticasone propionate (FLONASE) 50 mcg/actuation nasal spray; Use one spray in each nostril twice daily for the first week, then use one spray in each nostril once daily  Dispense: 18.2 mL; Refill: 3  - benzonatate (TESSALON) 100 MG capsule; Take 1 capsule (100 mg total) by mouth 3 (three) times daily as needed for Cough.  Dispense: 30 capsule; Refill: 0  -symptomatic tretament measures discussed    RTC precautions given for worsening or persistence of symptoms      Radha Buckley PA-C

## 2025-05-21 ENCOUNTER — PATIENT OUTREACH (OUTPATIENT)
Dept: ADMINISTRATIVE | Facility: HOSPITAL | Age: 40
End: 2025-05-21
Payer: COMMERCIAL

## 2025-07-02 ENCOUNTER — OFFICE VISIT (OUTPATIENT)
Dept: URGENT CARE | Facility: CLINIC | Age: 40
End: 2025-07-02
Payer: COMMERCIAL

## 2025-07-02 ENCOUNTER — TELEPHONE (OUTPATIENT)
Dept: FAMILY MEDICINE | Facility: CLINIC | Age: 40
End: 2025-07-02
Payer: COMMERCIAL

## 2025-07-02 VITALS
SYSTOLIC BLOOD PRESSURE: 142 MMHG | HEIGHT: 67 IN | TEMPERATURE: 98 F | HEART RATE: 67 BPM | WEIGHT: 293 LBS | RESPIRATION RATE: 18 BRPM | DIASTOLIC BLOOD PRESSURE: 74 MMHG | BODY MASS INDEX: 45.99 KG/M2 | OXYGEN SATURATION: 100 %

## 2025-07-02 DIAGNOSIS — T78.3XXA ANGIOEDEMA OF LIPS, INITIAL ENCOUNTER: Primary | ICD-10-CM

## 2025-07-02 DIAGNOSIS — R73.03 PREDIABETES: ICD-10-CM

## 2025-07-02 DIAGNOSIS — I10 ESSENTIAL HYPERTENSION: ICD-10-CM

## 2025-07-02 RX ORDER — PREDNISONE 20 MG/1
TABLET ORAL
Qty: 6 TABLET | Refills: 0 | Status: SHIPPED | OUTPATIENT
Start: 2025-07-02 | End: 2025-07-08

## 2025-07-02 RX ORDER — BETAMETHASONE SODIUM PHOSPHATE AND BETAMETHASONE ACETATE 3; 3 MG/ML; MG/ML
9 INJECTION, SUSPENSION INTRA-ARTICULAR; INTRALESIONAL; INTRAMUSCULAR; SOFT TISSUE
Status: COMPLETED | OUTPATIENT
Start: 2025-07-02 | End: 2025-07-02

## 2025-07-02 RX ADMIN — BETAMETHASONE SODIUM PHOSPHATE AND BETAMETHASONE ACETATE 9 MG: 3; 3 INJECTION, SUSPENSION INTRA-ARTICULAR; INTRALESIONAL; INTRAMUSCULAR; SOFT TISSUE at 12:07

## 2025-07-02 NOTE — PROGRESS NOTES
"Subjective:      Patient ID: Joyce Dumont is a 40 y.o. female.    Vitals:  height is 5' 7" (1.702 m) and weight is 133.8 kg (295 lb). Her oral temperature is 98.3 °F (36.8 °C). Her blood pressure is 142/74 (abnormal) and her pulse is 67. Her respiration is 18 and oxygen saturation is 100%.     Chief Complaint: Facial Swelling    40 year old patient presents herself with swollen lips and abdominal pain that she has been experiencing for the past 1 day. Pt stated that she took 2 benadryl last night that gave her little to no relief.  Patient reports she is on lisinopril.  Patient denies any difficulty breathing or shortness of breath.    Edema  This is a new problem. The current episode started yesterday. The problem has been unchanged. Associated symptoms include abdominal pain. The treatment provided mild relief.       Gastrointestinal:  Positive for abdominal pain.      Objective:     Physical Exam   Constitutional: She is oriented to person, place, and time. She appears well-developed. She is cooperative.  Non-toxic appearance. She does not appear ill. No distress.   HENT:   Head: Normocephalic and atraumatic.   Ears:   Right Ear: Hearing, tympanic membrane, external ear and ear canal normal.   Left Ear: Hearing, tympanic membrane, external ear and ear canal normal.   Nose: Nose normal. No mucosal edema, rhinorrhea or nasal deformity. No epistaxis. Right sinus exhibits no maxillary sinus tenderness and no frontal sinus tenderness. Left sinus exhibits no maxillary sinus tenderness and no frontal sinus tenderness.   Mouth/Throat: Uvula is midline, oropharynx is clear and moist and mucous membranes are normal. No trismus in the jaw. Normal dentition. No uvula swelling. No oropharyngeal exudate, posterior oropharyngeal edema or posterior oropharyngeal erythema.   Upper lip swelling is present.    Airway patent.  No difficulty breathing.  Maintaining secretions      Comments: Upper lip swelling is " present.    Airway patent.  No difficulty breathing.  Maintaining secretions  Eyes: Conjunctivae and lids are normal. No scleral icterus.   Neck: Trachea normal and phonation normal. Neck supple. No edema present. No erythema present. No neck rigidity present.   Cardiovascular: Normal rate, regular rhythm, normal heart sounds and normal pulses.   Pulmonary/Chest: Effort normal and breath sounds normal. No respiratory distress. She has no decreased breath sounds. She has no rhonchi.   Abdominal: Normal appearance.   Musculoskeletal: Normal range of motion.         General: No deformity. Normal range of motion.   Neurological: She is alert and oriented to person, place, and time. She exhibits normal muscle tone. Coordination normal.   Skin: Skin is warm, dry, intact, not diaphoretic and not pale.   Psychiatric: Her speech is normal and behavior is normal. Judgment and thought content normal.   Nursing note and vitals reviewed.      Assessment:     1. Angioedema of lips, initial encounter        Plan:     Patient in no acute distress in the clinic today.  Swelling present only to upper lip.  Patient given Celestone injection in the clinic today.  Advised to stop taking lisinopril.  Clinic  is contacted urgent follow up with internal medicine is made for tomorrow.  She is educated on the importance of monitoring her blood pressure at home.  Will start prednisone taper tomorrow.  Continue with antihistamines.  Strict ER precautions.      Angioedema of lips, initial encounter  -     betamethasone acetate-betamethasone sodium phosphate injection 9 mg  -     Ambulatory referral/consult to Internal Medicine  -     predniSONE (DELTASONE) 20 MG tablet; Take 1.5 tablets (30 mg total) by mouth once daily for 2 days, THEN 1 tablet (20 mg total) once daily for 2 days, THEN 0.5 tablets (10 mg total) once daily for 2 days.  Dispense: 6 tablet; Refill: 0      Patient Instructions   Stop taking Lisinopril.  Continue Benadryl  or Claritin.  Start steroid taper tomorrow.  Follow up with Internal Medicine.  If symptoms worsen go to the ER.    You must understand that you've received an Urgent Care treatment only and that you may be released before all your medical problems are known or treated. You, the patient, will arrange for follow up care as instructed.  Follow up with your PCP or specialty clinic as directed in the next 1-2 weeks if not improved or as needed.  You can call [phone] to schedule an appointment with the appropriate provider.  If your condition worsens we recommend that you receive another evaluation at the emergency room immediately or contact your primary medical clinics after hours call service to discuss your concerns.  Please return here or go to the Emergency Department for any concerns or worsening of condition.       You must understand that you have received treatment at an Urgent Care facility only, and that you may be  released before all of your medical problems are known or treated. Urgent Care facilities are not equipped to  handle life threatening emergencies. It is recommended that you seek care at an Emergency Department for  further evaluation of worsening or concerning symptoms, or possibly life threatening conditions as  discussed.

## 2025-07-02 NOTE — PATIENT INSTRUCTIONS
Stop taking Lisinopril.  Continue Benadryl or Claritin.  Start steroid taper tomorrow.  Follow up with Internal Medicine.  If symptoms worsen go to the ER.    You must understand that you've received an Urgent Care treatment only and that you may be released before all your medical problems are known or treated. You, the patient, will arrange for follow up care as instructed.  Follow up with your PCP or specialty clinic as directed in the next 1-2 weeks if not improved or as needed.  You can call [phone] to schedule an appointment with the appropriate provider.  If your condition worsens we recommend that you receive another evaluation at the emergency room immediately or contact your primary medical clinics after hours call service to discuss your concerns.  Please return here or go to the Emergency Department for any concerns or worsening of condition.

## 2025-07-03 ENCOUNTER — OFFICE VISIT (OUTPATIENT)
Dept: FAMILY MEDICINE | Facility: CLINIC | Age: 40
End: 2025-07-03
Payer: COMMERCIAL

## 2025-07-03 VITALS
HEIGHT: 67 IN | RESPIRATION RATE: 16 BRPM | BODY MASS INDEX: 45.99 KG/M2 | SYSTOLIC BLOOD PRESSURE: 140 MMHG | WEIGHT: 293 LBS | HEART RATE: 98 BPM | DIASTOLIC BLOOD PRESSURE: 82 MMHG | OXYGEN SATURATION: 96 % | TEMPERATURE: 98 F

## 2025-07-03 DIAGNOSIS — J30.9 CHRONIC ALLERGIC RHINITIS: ICD-10-CM

## 2025-07-03 DIAGNOSIS — Z12.31 ENCOUNTER FOR SCREENING MAMMOGRAM FOR MALIGNANT NEOPLASM OF BREAST: ICD-10-CM

## 2025-07-03 DIAGNOSIS — F41.1 GAD (GENERALIZED ANXIETY DISORDER): ICD-10-CM

## 2025-07-03 DIAGNOSIS — K21.9 GASTROESOPHAGEAL REFLUX DISEASE WITHOUT ESOPHAGITIS: ICD-10-CM

## 2025-07-03 DIAGNOSIS — D50.0 IRON DEFICIENCY ANEMIA DUE TO CHRONIC BLOOD LOSS: ICD-10-CM

## 2025-07-03 DIAGNOSIS — F33.1 MAJOR DEPRESSIVE DISORDER, RECURRENT EPISODE, MODERATE: ICD-10-CM

## 2025-07-03 DIAGNOSIS — T46.4X5A ANGIOEDEMA DUE TO ANGIOTENSIN CONVERTING ENZYME INHIBITOR (ACE-I): ICD-10-CM

## 2025-07-03 DIAGNOSIS — I10 PRIMARY HYPERTENSION: ICD-10-CM

## 2025-07-03 DIAGNOSIS — G89.29 CHRONIC MIDLINE LOW BACK PAIN WITH LEFT-SIDED SCIATICA: ICD-10-CM

## 2025-07-03 DIAGNOSIS — E88.819 INSULIN RESISTANCE: ICD-10-CM

## 2025-07-03 DIAGNOSIS — E55.9 VITAMIN D INSUFFICIENCY: ICD-10-CM

## 2025-07-03 DIAGNOSIS — T78.3XXA ANGIOEDEMA DUE TO ANGIOTENSIN CONVERTING ENZYME INHIBITOR (ACE-I): ICD-10-CM

## 2025-07-03 DIAGNOSIS — F51.01 PRIMARY INSOMNIA: ICD-10-CM

## 2025-07-03 DIAGNOSIS — E66.01 MORBID OBESITY WITH BMI OF 45.0-49.9, ADULT: ICD-10-CM

## 2025-07-03 DIAGNOSIS — Z00.00 WELL ADULT EXAM: Primary | ICD-10-CM

## 2025-07-03 DIAGNOSIS — M54.42 CHRONIC MIDLINE LOW BACK PAIN WITH LEFT-SIDED SCIATICA: ICD-10-CM

## 2025-07-03 LAB
ABSOLUTE EOSINOPHIL (OHS): 0 K/UL
ABSOLUTE MONOCYTE (OHS): 0.73 K/UL (ref 0.3–1)
ABSOLUTE NEUTROPHIL COUNT (OHS): 10.15 K/UL (ref 1.8–7.7)
ALBUMIN SERPL BCP-MCNC: 3.3 G/DL (ref 3.5–5.2)
ALP SERPL-CCNC: 107 UNIT/L (ref 40–150)
ALT SERPL W/O P-5'-P-CCNC: 16 UNIT/L (ref 10–44)
ANION GAP (OHS): 10 MMOL/L (ref 8–16)
AST SERPL-CCNC: 17 UNIT/L (ref 11–45)
BASOPHILS # BLD AUTO: 0.02 K/UL
BASOPHILS NFR BLD AUTO: 0.2 %
BILIRUB SERPL-MCNC: 0.2 MG/DL (ref 0.1–1)
BUN SERPL-MCNC: 11 MG/DL (ref 6–20)
CALCIUM SERPL-MCNC: 9.2 MG/DL (ref 8.7–10.5)
CHLORIDE SERPL-SCNC: 105 MMOL/L (ref 95–110)
CHOLEST SERPL-MCNC: 194 MG/DL (ref 120–199)
CHOLEST/HDLC SERPL: 3 {RATIO} (ref 2–5)
CO2 SERPL-SCNC: 21 MMOL/L (ref 23–29)
CREAT SERPL-MCNC: 0.7 MG/DL (ref 0.5–1.4)
EAG (OHS): 114 MG/DL (ref 68–131)
ERYTHROCYTE [DISTWIDTH] IN BLOOD BY AUTOMATED COUNT: 14.1 % (ref 11.5–14.5)
FERRITIN SERPL-MCNC: 74 NG/ML (ref 20–300)
GFR SERPLBLD CREATININE-BSD FMLA CKD-EPI: >60 ML/MIN/1.73/M2
GLUCOSE SERPL-MCNC: 110 MG/DL (ref 70–110)
HBA1C MFR BLD: 5.6 % (ref 4–5.6)
HCT VFR BLD AUTO: 35.5 % (ref 37–48.5)
HDLC SERPL-MCNC: 64 MG/DL (ref 40–75)
HDLC SERPL: 33 % (ref 20–50)
HGB BLD-MCNC: 11.1 GM/DL (ref 12–16)
IMM GRANULOCYTES # BLD AUTO: 0.05 K/UL (ref 0–0.04)
IMM GRANULOCYTES NFR BLD AUTO: 0.4 % (ref 0–0.5)
IRON SATN MFR SERPL: 12 % (ref 20–50)
IRON SERPL-MCNC: 53 UG/DL (ref 30–160)
LDLC SERPL CALC-MCNC: 111.8 MG/DL (ref 63–159)
LYMPHOCYTES # BLD AUTO: 1.26 K/UL (ref 1–4.8)
MCH RBC QN AUTO: 26.4 PG (ref 27–31)
MCHC RBC AUTO-ENTMCNC: 31.3 G/DL (ref 32–36)
MCV RBC AUTO: 85 FL (ref 82–98)
NONHDLC SERPL-MCNC: 130 MG/DL
NUCLEATED RBC (/100WBC) (OHS): 0 /100 WBC
PLATELET # BLD AUTO: 232 K/UL (ref 150–450)
PMV BLD AUTO: 12.9 FL (ref 9.2–12.9)
POTASSIUM SERPL-SCNC: 4.3 MMOL/L (ref 3.5–5.1)
PROT SERPL-MCNC: 8.2 GM/DL (ref 6–8.4)
RBC # BLD AUTO: 4.2 M/UL (ref 4–5.4)
RELATIVE EOSINOPHIL (OHS): 0 %
RELATIVE LYMPHOCYTE (OHS): 10.3 % (ref 18–48)
RELATIVE MONOCYTE (OHS): 6 % (ref 4–15)
RELATIVE NEUTROPHIL (OHS): 83.1 % (ref 38–73)
SODIUM SERPL-SCNC: 136 MMOL/L (ref 136–145)
TIBC SERPL-MCNC: 459 UG/DL (ref 250–450)
TRANSFERRIN SERPL-MCNC: 310 MG/DL (ref 200–375)
TRIGL SERPL-MCNC: 91 MG/DL (ref 30–150)
TSH SERPL-ACNC: 0.6 UIU/ML (ref 0.4–4)
WBC # BLD AUTO: 12.21 K/UL (ref 3.9–12.7)

## 2025-07-03 PROCEDURE — 83036 HEMOGLOBIN GLYCOSYLATED A1C: CPT | Performed by: INTERNAL MEDICINE

## 2025-07-03 PROCEDURE — 3008F BODY MASS INDEX DOCD: CPT | Mod: CPTII,S$GLB,, | Performed by: INTERNAL MEDICINE

## 2025-07-03 PROCEDURE — 3077F SYST BP >= 140 MM HG: CPT | Mod: CPTII,S$GLB,, | Performed by: INTERNAL MEDICINE

## 2025-07-03 PROCEDURE — 99396 PREV VISIT EST AGE 40-64: CPT | Mod: S$GLB,,, | Performed by: INTERNAL MEDICINE

## 2025-07-03 PROCEDURE — 80061 LIPID PANEL: CPT | Performed by: INTERNAL MEDICINE

## 2025-07-03 PROCEDURE — 80053 COMPREHEN METABOLIC PANEL: CPT | Performed by: INTERNAL MEDICINE

## 2025-07-03 PROCEDURE — 3079F DIAST BP 80-89 MM HG: CPT | Mod: CPTII,S$GLB,, | Performed by: INTERNAL MEDICINE

## 2025-07-03 PROCEDURE — 1160F RVW MEDS BY RX/DR IN RCRD: CPT | Mod: CPTII,S$GLB,, | Performed by: INTERNAL MEDICINE

## 2025-07-03 PROCEDURE — 84443 ASSAY THYROID STIM HORMONE: CPT | Performed by: INTERNAL MEDICINE

## 2025-07-03 PROCEDURE — 82728 ASSAY OF FERRITIN: CPT | Performed by: INTERNAL MEDICINE

## 2025-07-03 PROCEDURE — 84466 ASSAY OF TRANSFERRIN: CPT | Performed by: INTERNAL MEDICINE

## 2025-07-03 PROCEDURE — 1159F MED LIST DOCD IN RCRD: CPT | Mod: CPTII,S$GLB,, | Performed by: INTERNAL MEDICINE

## 2025-07-03 PROCEDURE — 85025 COMPLETE CBC W/AUTO DIFF WBC: CPT | Performed by: INTERNAL MEDICINE

## 2025-07-03 RX ORDER — TIZANIDINE 4 MG/1
4 TABLET ORAL NIGHTLY PRN
Qty: 90 TABLET | Refills: 0 | Status: SHIPPED | OUTPATIENT
Start: 2025-07-03

## 2025-07-03 RX ORDER — ESCITALOPRAM OXALATE 5 MG/1
5 TABLET ORAL DAILY
Qty: 90 TABLET | Refills: 3 | Status: SHIPPED | OUTPATIENT
Start: 2025-07-03 | End: 2026-07-03

## 2025-07-03 RX ORDER — ERGOCALCIFEROL 1.25 MG/1
50000 CAPSULE ORAL
Qty: 12 CAPSULE | Refills: 1 | Status: SHIPPED | OUTPATIENT
Start: 2025-07-03

## 2025-07-03 RX ORDER — AMLODIPINE BESYLATE 5 MG/1
5 TABLET ORAL DAILY
Qty: 90 TABLET | Refills: 3 | Status: SHIPPED | OUTPATIENT
Start: 2025-07-03 | End: 2026-07-03

## 2025-07-03 RX ORDER — IBUPROFEN 800 MG/1
800 TABLET, FILM COATED ORAL 3 TIMES DAILY
COMMUNITY
Start: 2025-03-20

## 2025-07-03 RX ORDER — PANTOPRAZOLE SODIUM 40 MG/1
40 TABLET, DELAYED RELEASE ORAL DAILY PRN
Qty: 90 TABLET | Refills: 1 | Status: SHIPPED | OUTPATIENT
Start: 2025-07-03

## 2025-07-03 NOTE — PROGRESS NOTES
Subjective:       Patient ID: Joyce Dumont is a 40 y.o. female.    Medication List with Changes/Refills   New Medications    AMLODIPINE (NORVASC) 5 MG TABLET    Take 1 tablet (5 mg total) by mouth once daily.    ESCITALOPRAM OXALATE (LEXAPRO) 5 MG TAB    Take 1 tablet (5 mg total) by mouth once daily.   Current Medications    AZELASTINE (ASTELIN) 137 MCG (0.1 %) NASAL SPRAY    1 spray (137 mcg total) by Nasal route 2 (two) times daily.    FLUTICASONE PROPIONATE (FLONASE) 50 MCG/ACTUATION NASAL SPRAY    Use one spray in each nostril twice daily for the first week, then use one spray in each nostril once daily    IBUPROFEN (ADVIL,MOTRIN) 800 MG TABLET    Take 800 mg by mouth 3 (three) times daily.    ISIBLOOM 0.15-0.03 MG PER TABLET        LEVOCETIRIZINE (XYZAL) 5 MG TABLET    TAKE 1 TABLET(5 MG) BY MOUTH EVERY EVENING    PREDNISONE (DELTASONE) 20 MG TABLET    Take 1.5 tablets (30 mg total) by mouth once daily for 2 days, THEN 1 tablet (20 mg total) once daily for 2 days, THEN 0.5 tablets (10 mg total) once daily for 2 days.   Changed and/or Refilled Medications    Modified Medication Previous Medication    ERGOCALCIFEROL (ERGOCALCIFEROL) 50,000 UNIT CAP ergocalciferol (ERGOCALCIFEROL) 50,000 unit Cap       Take 1 capsule (50,000 Units total) by mouth every 7 days.    TAKE 1 CAPSULE BY MOUTH EVERY 7 DAYS    PANTOPRAZOLE (PROTONIX) 40 MG TABLET pantoprazole (PROTONIX) 40 MG tablet       Take 1 tablet (40 mg total) by mouth daily as needed.    TAKE 1 TABLET(40 MG) BY MOUTH EVERY DAY    TIZANIDINE (ZANAFLEX) 4 MG TABLET tiZANidine (ZANAFLEX) 4 MG tablet       Take 1 tablet (4 mg total) by mouth nightly as needed.    TAKE 1 TABLET(4 MG) BY MOUTH EVERY 6 HOURS AS NEEDED   Discontinued Medications    AZELASTINE (ASTELIN) 137 MCG (0.1 %) NASAL SPRAY    SPRAY 1 SPRAY BY NASAL ROUTE 2 TIMES DAILY    DEXTROAMPHETAMINE-AMPHETAMINE (ADDERALL XR) 20 MG 24 HR CAPSULE    Take 20 mg by mouth every morning.    FERROUS SULFATE  (IRON) 325 MG (65 MG IRON) TAB TABLET    Take 1 tablet (325 mg total) by mouth daily with breakfast.    NORETHINDRONE AC-ETH ESTRADIOL (FEMHRT LOW DOSE) 0.5-2.5 MG-MCG PER TABLET    Take 1 tablet by mouth once daily.       Chief Complaint: Annual Exam (Medication adjustment )  She is here today for an annual exam. She has not been seen since 2022.     She was seen yesterday at  for lip swelling diagnosed as angioedema from her lisinopril. She was given IM steroids and started on tapering oral steroids. Today she reports resolution of her swelling. No shortness of breath.     She has hypertension but is now off medication. She does have a headache and reports home BP has been elevated. She denies any CAD. She denies chest pain or shortness of breath. Lipids on 5/2022 were 176/88/63/95 without treatment.     She has chronic allergic rhinitis and uses astelin and flonase as needed. She will also use antihistamines for increased symptoms. Today she denies any symptoms.     She has insulin resistance with HbA1c of 5.7 in the past. Most recent labs on 5/2022 show HbA1c of 5.3.      She has anemia on labs from 5/2022 showing H+H 11/35, iron 11 and ferritin of 39.  She is no longer taking iron.     She has GERD and is taking pantoprazole 40 mg as needed.  She feels this controls her symptoms. She has not had an EGD.     She has vitamin D deficiency but stopped high dose supplementation because she ran out.     She has anxiety and depression that she feels is uncontrolled. She is struggling to sleep at night. No panic attacks or suicidal ideations. She would like to start treatment but is concerned about weight loss. In the past she was diagnosed with ADD and treated with adderall. She has been off for sometime. This was managed by psychiatry.     She has chronic low back pain that will intermittent radiate down her left leg. Worse with laying flat and better with elevation of feet and sitting.  She uses tizanidine at  "night as needed for her pain which helps.  No weakness in her legs.     She lives with her 2 children and feels safe at home. 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 She does not exercise and does not eat healthy.  She continues to struggle with weight loss.     Pap-----2024 at Dr. Stevens's office   Tdap---5/2022  Influenza vaccine---refused   Covid vaccine---2 doses     Review of Systems   Constitutional:  Negative for appetite change, fatigue, fever and unexpected weight change.   HENT:  Negative for congestion, ear pain, hearing loss, sore throat and trouble swallowing.    Eyes:  Negative for pain and visual disturbance.   Respiratory:  Negative for cough, chest tightness, shortness of breath and wheezing.    Cardiovascular:  Negative for chest pain, palpitations and leg swelling.   Gastrointestinal:  Negative for abdominal pain, blood in stool, constipation, diarrhea, nausea and vomiting.   Endocrine: Negative for polyuria.   Genitourinary:  Negative for difficulty urinating, dysuria, frequency, hematuria, pelvic pain and urgency.   Musculoskeletal:  Positive for back pain. Negative for arthralgias and myalgias.   Skin:  Negative for rash.   Neurological:  Positive for headaches. Negative for dizziness, weakness and numbness.   Hematological:  Does not bruise/bleed easily.   Psychiatric/Behavioral:  Positive for dysphoric mood and sleep disturbance. Negative for suicidal ideas. The patient is nervous/anxious.        Objective:      Vitals:    07/03/25 1003   BP: (!) 140/82   BP Location: Right arm   Patient Position: Sitting   Pulse: 98   Resp: 16   Temp: 97.7 °F (36.5 °C)   TempSrc: Temporal   SpO2: 96%   Weight: (!) 138.2 kg (304 lb 10.8 oz)   Height: 5' 7" (1.702 m)       Body mass index is 47.72 kg/m².  Physical Exam    General appearance: No acute distress, cooperative  Eyes: PERRL, EOMI, conjunctiva clear  Ears: normal external ear and pinna, tm clear without drainage, canals clear  Nose: " Normal mucosa without drainage  Throat: no exudates or erythema, tonsils not enlarged  Mouth: no sores or lesions, moist mucous membranes  Neck: FROM, soft, supple, no thyromegaly, no bruits  Lymph: no anterior or posterior cervical adenopathy  Heart::  Regular rate and rhythm, no murmur  Lung: Clear to ascultation bilaterally, no wheezing, no rales, no rhonchi, no distress  Abdomen: Soft, nontender, no distention, no hepatosplenomegaly, bowel sounds normal, no guarding, no rebound, no peritoneal signs  Skin: no rashes, no lesions  Extremities: no edema, no cyanosis  Neuro: CN 2-12 intact, 5/5 muscle strength upper and lower extremity bilaterally, 2+ DTRs UE and LE bilaterally, normal gait  Peripheral pulses: 2+ pedal pulses bilaterally, good perfusion and color  Musculoskeletal: FROM, good strenth, no tenderness  Joint: normal appearance, no swelling, no warmth, no deformity in all joints    Assessment:       1. Well adult exam    2. Angioedema due to angiotensin converting enzyme inhibitor (ACE-I)    3. Primary hypertension    4. Chronic allergic rhinitis    5. Insulin resistance    6. Iron deficiency anemia due to chronic blood loss    7. Gastroesophageal reflux disease without esophagitis    8. Vitamin D insufficiency    9. Major depressive disorder, recurrent episode, moderate    10. RASHID (generalized anxiety disorder)    11. Primary insomnia    12. Chronic midline low back pain with left-sided sciatica    13. Morbid obesity with BMI of 45.0-49.9, adult    14. Encounter for screening mammogram for malignant neoplasm of breast          Plan:       Well adult exam  She is due for labs, mammogram. Vaccines UTD and will get her pap records.     Angioedema due to ACE-I  Symptoms resolved and she is doing better today    Primary hypertension  Uncontrolled and off lisinopril due to angioedema reaction. Start amlodipine 5 mg daily and recheck with me in 3 weeks.     Chronic allergic rhinitis  Improved on current  regimen.    Insulin resistance  Due to recheck HbA1c. She has continued weight gain    Iron deficiency anemia   Due to recheck iron studies. She is off iron.    Gastroesophageal reflux disease without esophagitis   Good control on PRN pantoprazole    Vitamin D deficiency  Restart high dose supplementation    Major depression moderate  Start lexapro and f/u in 3 weeks    General anxiety disorder  Uncontrolled and will start lexapro 5 mg daily. Recheck with me in 3 weeks    Primary Insomnia  Uncontrolled and will work on her anxiety. She will f/u in 3 weeks and consider treatment at that time    Chronic midline low back pain with left sided sciatica  Stable on tizanidine PRN and repositioning with pillows at night.     Morbid obesity with BMI of 45.0-49.9, adult  Long discussion on the benefits of healthy eating and regular exercise to help lose weight and help control hypertension and prevent diabetes    Follow up in about 3 weeks (around 7/24/2025) for recheck depression/anxiety and BP .

## 2025-07-08 ENCOUNTER — PATIENT MESSAGE (OUTPATIENT)
Dept: FAMILY MEDICINE | Facility: CLINIC | Age: 40
End: 2025-07-08
Payer: COMMERCIAL

## 2025-07-21 ENCOUNTER — PATIENT OUTREACH (OUTPATIENT)
Dept: ADMINISTRATIVE | Facility: HOSPITAL | Age: 40
End: 2025-07-21
Payer: COMMERCIAL

## 2025-08-05 ENCOUNTER — OFFICE VISIT (OUTPATIENT)
Dept: FAMILY MEDICINE | Facility: CLINIC | Age: 40
End: 2025-08-05
Payer: COMMERCIAL

## 2025-08-05 VITALS
DIASTOLIC BLOOD PRESSURE: 82 MMHG | SYSTOLIC BLOOD PRESSURE: 122 MMHG | OXYGEN SATURATION: 98 % | BODY MASS INDEX: 45.99 KG/M2 | HEIGHT: 67 IN | RESPIRATION RATE: 20 BRPM | WEIGHT: 293 LBS | TEMPERATURE: 98 F | HEART RATE: 82 BPM

## 2025-08-05 DIAGNOSIS — G47.33 OSA (OBSTRUCTIVE SLEEP APNEA): ICD-10-CM

## 2025-08-05 DIAGNOSIS — I10 PRIMARY HYPERTENSION: Primary | ICD-10-CM

## 2025-08-05 DIAGNOSIS — F33.1 MAJOR DEPRESSIVE DISORDER, RECURRENT EPISODE, MODERATE: ICD-10-CM

## 2025-08-05 DIAGNOSIS — E78.5 HYPERLIPIDEMIA, UNSPECIFIED HYPERLIPIDEMIA TYPE: ICD-10-CM

## 2025-08-05 DIAGNOSIS — F51.01 PRIMARY INSOMNIA: ICD-10-CM

## 2025-08-05 DIAGNOSIS — D50.0 IRON DEFICIENCY ANEMIA DUE TO CHRONIC BLOOD LOSS: ICD-10-CM

## 2025-08-05 DIAGNOSIS — F41.1 GAD (GENERALIZED ANXIETY DISORDER): ICD-10-CM

## 2025-08-05 DIAGNOSIS — E66.01 MORBID OBESITY WITH BMI OF 45.0-49.9, ADULT: ICD-10-CM

## 2025-08-05 PROCEDURE — 99214 OFFICE O/P EST MOD 30 MIN: CPT | Mod: S$GLB,,, | Performed by: INTERNAL MEDICINE

## 2025-08-05 PROCEDURE — 3008F BODY MASS INDEX DOCD: CPT | Mod: CPTII,S$GLB,, | Performed by: INTERNAL MEDICINE

## 2025-08-05 PROCEDURE — 3079F DIAST BP 80-89 MM HG: CPT | Mod: CPTII,S$GLB,, | Performed by: INTERNAL MEDICINE

## 2025-08-05 PROCEDURE — 1159F MED LIST DOCD IN RCRD: CPT | Mod: CPTII,S$GLB,, | Performed by: INTERNAL MEDICINE

## 2025-08-05 PROCEDURE — 1160F RVW MEDS BY RX/DR IN RCRD: CPT | Mod: CPTII,S$GLB,, | Performed by: INTERNAL MEDICINE

## 2025-08-05 PROCEDURE — 3044F HG A1C LEVEL LT 7.0%: CPT | Mod: CPTII,S$GLB,, | Performed by: INTERNAL MEDICINE

## 2025-08-05 PROCEDURE — G2211 COMPLEX E/M VISIT ADD ON: HCPCS | Mod: S$GLB,,, | Performed by: INTERNAL MEDICINE

## 2025-08-05 PROCEDURE — 3074F SYST BP LT 130 MM HG: CPT | Mod: CPTII,S$GLB,, | Performed by: INTERNAL MEDICINE

## 2025-08-05 RX ORDER — ESCITALOPRAM OXALATE 10 MG/1
10 TABLET ORAL DAILY
Qty: 90 TABLET | Refills: 3 | Status: SHIPPED | OUTPATIENT
Start: 2025-08-05 | End: 2026-08-05

## 2025-08-05 NOTE — PROGRESS NOTES
Subjective:       Patient ID: Joyce Dumont is a 40 y.o. female.    Medication List with Changes/Refills   New Medications    ESCITALOPRAM OXALATE (LEXAPRO) 10 MG TABLET    Take 1 tablet (10 mg total) by mouth once daily.   Current Medications    AMLODIPINE (NORVASC) 5 MG TABLET    Take 1 tablet (5 mg total) by mouth once daily.    AZELASTINE (ASTELIN) 137 MCG (0.1 %) NASAL SPRAY    1 spray (137 mcg total) by Nasal route 2 (two) times daily.    ERGOCALCIFEROL (ERGOCALCIFEROL) 50,000 UNIT CAP    Take 1 capsule (50,000 Units total) by mouth every 7 days.    FLUTICASONE PROPIONATE (FLONASE) 50 MCG/ACTUATION NASAL SPRAY    Use one spray in each nostril twice daily for the first week, then use one spray in each nostril once daily    IBUPROFEN (ADVIL,MOTRIN) 800 MG TABLET    Take 800 mg by mouth 3 (three) times daily.    ISIBLOOM 0.15-0.03 MG PER TABLET        LEVOCETIRIZINE (XYZAL) 5 MG TABLET    TAKE 1 TABLET(5 MG) BY MOUTH EVERY EVENING    PANTOPRAZOLE (PROTONIX) 40 MG TABLET    Take 1 tablet (40 mg total) by mouth daily as needed.    TIZANIDINE (ZANAFLEX) 4 MG TABLET    Take 1 tablet (4 mg total) by mouth nightly as needed.   Discontinued Medications    ESCITALOPRAM OXALATE (LEXAPRO) 5 MG TAB    Take 1 tablet (5 mg total) by mouth once daily.       Chief Complaint: Follow-up  She is here today to f/u on multiple issues from her appt one month ago.     She has hypertension and had an angioedema due to ACEI.  She was started on amlodipine 5 mg daily for elevated BP.  She is tolerating the medication well. She does not check her BP at home. She denies chest pain or shortness of breath.     She has mild hyperlipidemia with labs on 7/2025 showing 194/91/64/111.  She is not on treatment.     She had anxiety/depression and was started on lexapro 5 mg daily. She does feel this is helping a little.  She is less anxious then before starting the medication. She would like to try increasing her dose to the 10 mg daily  "dose. She does feel it has helped with her sleeping. No suicidal ideations. No panic attacks.     She has iron deficiency anemia with labs on 7/2025 showing H+H 11/35 with iron of 12 and ferritin of 74.  She was advised to start taking iron three times a week.     She does report snoring and waking from sleep. She has daytime sleepiness.      Review of Systems   Constitutional:  Negative for activity change, appetite change, chills, fatigue and fever.   HENT:  Positive for congestion. Negative for ear discharge, ear pain, mouth sores, postnasal drip, rhinorrhea, sinus pressure and sore throat.    Eyes:  Negative for pain, discharge and redness.   Respiratory:  Negative for cough, chest tightness, shortness of breath and wheezing.    Gastrointestinal:  Negative for abdominal pain, constipation, diarrhea, nausea and vomiting.   Genitourinary:  Negative for dysuria.   Musculoskeletal:  Negative for arthralgias and neck stiffness.   Skin:  Negative for rash.   Allergic/Immunologic: Positive for environmental allergies.   Neurological:  Negative for headaches.   Hematological:  Negative for adenopathy.   Psychiatric/Behavioral:  Positive for dysphoric mood. Negative for sleep disturbance and suicidal ideas. The patient is nervous/anxious.        Objective:      Vitals:    08/05/25 0707 08/05/25 0714   BP: (!) 136/92 122/82   BP Location: Right arm    Patient Position: Sitting    Pulse: 82    Resp: 20    Temp: 98.2 °F (36.8 °C)    TempSrc: Temporal    SpO2: 98%    Weight: (!) 138.5 kg (305 lb 3.6 oz)    Height: 5' 7" (1.702 m)      Body mass index is 47.81 kg/m².  Physical Exam    General appearance: No acute distress, cooperative  Neck: FROM, soft, supple  Lymph: no anterior or posterior cervical adenopathy  Heart::  Regular rate and rhythm, no murmur  Lung: Clear to ascultation bilaterally, no wheezing, no rales, no rhonchi, no distress  Abdomen: Soft, nontender, no distention, no hepatosplenomegaly, bowel sounds " normal, no guarding, no rebound, no peritoneal signs  Skin: no rashes, no lesions  Extremities: no edema, no cyanosis  Neuro: no focal abnormalities, strength 5/5 b/l UE and LE, 2+ DTRs b/l UE and LE, normal gait  Peripheral pulses: 2+ pedal pulses bilaterally, good perfusion and color      Assessment:       1. Primary hypertension    2. Hyperlipidemia, unspecified hyperlipidemia type    3. Iron deficiency anemia due to chronic blood loss    4. Major depressive disorder, recurrent episode, moderate    5. RASHID (generalized anxiety disorder)    6. Primary insomnia    7. LARON (obstructive sleep apnea)    8. Morbid obesity with BMI of 45.0-49.9, adult        Plan:       Primary hypertension  Well controlled and continue current regimen.   -     Hemoglobin A1C; Future; Expected date: 08/05/2025  -     Lipid Panel; Future; Expected date: 08/05/2025  -     Basic Metabolic Panel; Future; Expected date: 08/05/2025    Hyperlipidemia, unspecified hyperlipidemia type  Mild elevated and discussed lifestyle changes to help control cholesterol. Recommended a statin but she did not want at this time. Will recheck lipids in 6 months.     Iron deficiency anemia due to chronic blood loss  Mild and advised to start iron three times a week. Will recheck labs in 6 months.   -     CBC Auto Differential; Future; Expected date: 08/05/2025  -     Iron and TIBC; Future; Expected date: 08/05/2025  -     Ferritin; Future; Expected date: 08/05/2025    Major depressive disorder, recurrent episode, moderate  Some improvement with starting lexapro.  Will increase to 10 mg daily dose and recheck via mychart in 3 weeks.   -     EScitalopram oxalate (LEXAPRO) 10 MG tablet; Take 1 tablet (10 mg total) by mouth once daily.  Dispense: 90 tablet; Refill: 3    RASHID (generalized anxiety disorder)  Improved but still with symptoms. Will increase her lexapro    Primary insomnia  Better since starting lexapro    LARON (obstructive sleep apnea)  Concern for LARON and  will get a home sleep study   -     Home Sleep Study; Future    Morbid obesity with BMI of 45.0-49.9, adult  Long discussion on the benefits of healthy eating and regular exercise to help lose weight and help control hypertension and hyperlipidemia. Consider GLP1 if she has LARON.     Follow up in about 6 months (around 2/5/2026) for chronic medical issues.

## 2025-08-25 ENCOUNTER — PATIENT MESSAGE (OUTPATIENT)
Dept: FAMILY MEDICINE | Facility: CLINIC | Age: 40
End: 2025-08-25
Payer: COMMERCIAL

## 2025-08-28 ENCOUNTER — PATIENT MESSAGE (OUTPATIENT)
Dept: FAMILY MEDICINE | Facility: CLINIC | Age: 40
End: 2025-08-28
Payer: COMMERCIAL